# Patient Record
Sex: MALE | Race: WHITE | NOT HISPANIC OR LATINO | URBAN - NONMETROPOLITAN AREA
[De-identification: names, ages, dates, MRNs, and addresses within clinical notes are randomized per-mention and may not be internally consistent; named-entity substitution may affect disease eponyms.]

---

## 2017-12-15 PROBLEM — H35.3131: Noted: 2019-01-07

## 2017-12-15 PROBLEM — H16.223: Noted: 2017-12-15

## 2017-12-15 PROBLEM — H35.373: Noted: 2017-12-15

## 2017-12-15 PROBLEM — Z96.1: Noted: 2017-12-15

## 2019-01-07 ENCOUNTER — IMPORTED ENCOUNTER (OUTPATIENT)
Dept: URBAN - NONMETROPOLITAN AREA CLINIC 1 | Facility: CLINIC | Age: 79
End: 2019-01-07

## 2019-01-07 PROCEDURE — 92134 CPTRZ OPH DX IMG PST SGM RTA: CPT

## 2019-01-07 PROCEDURE — 92014 COMPRE OPH EXAM EST PT 1/>: CPT

## 2019-07-08 ENCOUNTER — IMPORTED ENCOUNTER (OUTPATIENT)
Dept: URBAN - NONMETROPOLITAN AREA CLINIC 1 | Facility: CLINIC | Age: 79
End: 2019-07-08

## 2019-07-08 PROCEDURE — 92014 COMPRE OPH EXAM EST PT 1/>: CPT

## 2019-07-08 PROCEDURE — 92134 CPTRZ OPH DX IMG PST SGM RTA: CPT

## 2019-07-08 PROCEDURE — 92015 DETERMINE REFRACTIVE STATE: CPT

## 2019-07-08 NOTE — PATIENT DISCUSSION
ARMD OU w/ ERM -  discussed findings w/patient-  continue AREDS 2 PO BID -  continue AG use daily patient to call or come in ASAP if changes in vision are noted from today-  OCT mac done today ERM OU stable-  monitor yearly or prn

## 2022-04-09 ASSESSMENT — VISUAL ACUITY
OD_CC: 20/20-2
OS_CC: 20/20
OS_SC: 20/20
OU_SC: 20/20
OD_SC: 20/20

## 2022-04-09 ASSESSMENT — TONOMETRY
OD_IOP_MMHG: 10
OS_IOP_MMHG: 08
OS_IOP_MMHG: 10
OD_IOP_MMHG: 08

## 2022-07-25 ENCOUNTER — HOSPITAL ENCOUNTER (OUTPATIENT)
Age: 82
Setting detail: OUTPATIENT SURGERY
Discharge: HOME | End: 2022-07-25
Attending: INTERNAL MEDICINE | Admitting: INTERNAL MEDICINE
Payer: MEDICARE

## 2022-07-25 VITALS
SYSTOLIC BLOOD PRESSURE: 129 MMHG | HEART RATE: 85 BPM | DIASTOLIC BLOOD PRESSURE: 76 MMHG | HEIGHT: 71 IN | RESPIRATION RATE: 16 BRPM | TEMPERATURE: 98.1 F | BODY MASS INDEX: 16.8 KG/M2 | OXYGEN SATURATION: 100 % | WEIGHT: 120 LBS

## 2022-07-25 DIAGNOSIS — I35.0 AORTIC VALVE STENOSIS, ETIOLOGY OF CARDIAC VALVE DISEASE UNSPECIFIED: ICD-10-CM

## 2022-07-25 PROCEDURE — 74011250636 HC RX REV CODE- 250/636: Performed by: INTERNAL MEDICINE

## 2022-07-25 RX ORDER — SODIUM CHLORIDE 9 MG/ML
75 INJECTION, SOLUTION INTRAVENOUS CONTINUOUS
Status: DISCONTINUED | OUTPATIENT
Start: 2022-07-25 | End: 2022-07-25 | Stop reason: HOSPADM

## 2022-07-25 RX ORDER — TAMSULOSIN HYDROCHLORIDE 0.4 MG/1
0.4 CAPSULE ORAL DAILY
COMMUNITY

## 2022-07-25 RX ORDER — BISMUTH SUBSALICYLATE 262 MG
1 TABLET,CHEWABLE ORAL DAILY
COMMUNITY

## 2022-07-25 RX ORDER — SIMVASTATIN 20 MG/1
20 TABLET, FILM COATED ORAL
COMMUNITY

## 2022-07-25 RX ADMIN — SODIUM CHLORIDE 75 ML/HR: 9 INJECTION, SOLUTION INTRAVENOUS at 13:59

## 2022-07-25 NOTE — ROUTINE PROCESS
Cardiac Cath Lab Recovery Arrival Note:      Sandi Gerard arrived to Cardiac Cath Lab, Recovery Area. Staff introduced to patient. Patient identifiers verified with NAME and DATE OF BIRTH. Procedure verified with patient. Consent forms reviewed and signed by patient or authorized representative and verified. Allergies verified. Patient informed of procedure and plan of care. Questions answered with review. Patient prepped for procedure, per orders from physician, prior to arrival.    Patient on cardiac monitor, non-invasive blood pressure, SPO2 monitor. Patient is A&Ox 40. Patient reports no complaints. Patient in stretcher, in low position, with side rails up, call bell within reach, patient instructed to call of assistance as needed. Patient prep in: [facility] Recovery Area, Bed# FT 5. Family in: waiting room. Prep by: KEENA Cordova

## 2022-07-25 NOTE — PROGRESS NOTES
Procedure cancelled due to procedure delay. Patient and family elected to reschedule. PIV and tele removed. Patient wheeled out via wheelchair.

## 2022-07-25 NOTE — H&P
Outpatient Cath Lab History and Physical     7/25/2022  Patient: Kevin Moore 81 y.o. male   Chief Complaint: []   Angina   [x]   Dyspnea   []       History of Present Illness: (for details see office notes)  82 yo M with severe aortic stenosis, mildly symptomatic with exertional fatigue and minimal ROBB. History:(for details see office notes)  Severe aortic stenosis    Review of Systems  Except as noted in HPI, patient denies recent fever or chills, nausea, vomiting, diarrhea, hemoptysis, hematemesis, dysuria, myalgias, focal neurologic symptoms, ecchymosis, angioedema, odynophagia, dysphagia, sore throat, earache,rash, melena, hematochezia, depression, GERD, cold intolerance, petechia, bleeding gums, or significant weight loss. A comprehensive review of systems was negative except for that written in the HPI. No family history on file. (see office notes for details)  Social History     Tobacco Use    Smoking status: Not on file    Smokeless tobacco: Not on file   Substance Use Topics    Alcohol use: Not on file   (see office notes for details)    Allergies: No Known Allergies    Prior to Admission Medications (details in office records):  Prior to Admission medications    Medication Sig Start Date End Date Taking? Authorizing Provider   tamsulosin (FLOMAX) 0.4 mg capsule Take 0.4 mg by mouth in the morning. Yes Provider, Historical   simvastatin (ZOCOR) 20 mg tablet Take 20 mg by mouth nightly. Yes Provider, Historical   mirabegron ER (Myrbetriq) 25 mg ER tablet Take 25 mg by mouth in the morning. Yes Provider, Historical   multivitamin (ONE A DAY) tablet Take 1 Tablet by mouth in the morning. Yes Provider, Historical   ubidecarenone/vitamin E mixed (COQ10  PO) Take 1 Tablet by mouth daily. Yes Provider, Historical   vit A/vit C/vit E/zinc/copper (OCUVITE PRESERVISION PO) Take  by mouth daily.    Yes Provider, Historical         Physical Exam:  Overall VSSAF;  Visit Vitals  BP (!) 156/79 (BP 1 Location: Right upper arm, BP Patient Position: At rest)   Pulse 84   Temp 98.1 °F (36.7 °C)   Resp 15   Ht 5' 11\" (1.803 m)   Wt 54.4 kg (120 lb)   SpO2 100%   BMI 16.74 kg/m²     Temp (24hrs), Av.1 °F (36.7 °C), Min:98.1 °F (36.7 °C), Max:98.1 °F (36.7 °C)      Physical Exam  GEN: NAD, appears stated age  HEENT: EOMI, MMM, OP clear  NECK: Normal JVP  CV: RRR, normal S1, soft S2, harsh III/VI late-peaking systolic murmur at LUSB, no rubs or gallops  LUNGS: CTAB, no W/R/R  ABD: NABS, soft, NT/ND  EXT: No edema, 2+ and symmetrical radial pulses and pedal pulses b/l  PSYCH: Mood and affect normal  NEURO: AAO, MAEW, face symmetrical, speech intact    Labs: per outpatient records  CXR: per outpatient records    Plan of Care/Planned Procedure:  Risks, benefits, and alternatives reviewed with patient and he agrees to proceed with the procedure. For other plans, see orders. Discussed risks/benefits/alternatives of cardiac catheterization and possible PCI including bleeding, contrast-reaction, kidney injury, stroke, MI, death, loss of limb or the need for emergent surgery and the patient consented to proceed. Plan 6 Fr slender LRA approach. Andrzej Humphreys MD  [facility] / [address] [place] [address] [facility]  22

## 2022-08-01 ENCOUNTER — HOSPITAL ENCOUNTER (OUTPATIENT)
Age: 82
Setting detail: OUTPATIENT SURGERY
Discharge: HOME | End: 2022-08-01
Attending: INTERNAL MEDICINE | Admitting: INTERNAL MEDICINE
Payer: MEDICARE

## 2022-08-01 VITALS
BODY MASS INDEX: 16.8 KG/M2 | SYSTOLIC BLOOD PRESSURE: 117 MMHG | RESPIRATION RATE: 21 BRPM | HEIGHT: 71 IN | DIASTOLIC BLOOD PRESSURE: 58 MMHG | WEIGHT: 120 LBS | HEART RATE: 102 BPM | OXYGEN SATURATION: 94 % | TEMPERATURE: 97.8 F

## 2022-08-01 DIAGNOSIS — I35.0 AORTIC VALVE STENOSIS, ETIOLOGY OF CARDIAC VALVE DISEASE UNSPECIFIED: ICD-10-CM

## 2022-08-01 PROBLEM — I10 PRIMARY HYPERTENSION: Status: ACTIVE | Noted: 2022-08-01

## 2022-08-01 PROBLEM — E78.2 MIXED HYPERLIPIDEMIA: Status: ACTIVE | Noted: 2022-08-01

## 2022-08-01 PROBLEM — N40.0 BPH (BENIGN PROSTATIC HYPERPLASIA): Status: ACTIVE | Noted: 2022-08-01

## 2022-08-01 PROBLEM — C15.9 ESOPHAGEAL CANCER (HCC): Status: ACTIVE | Noted: 2022-08-01

## 2022-08-01 LAB
GLUCOSE BLD STRIP.AUTO-MCNC: 83 MG/DL (ref 65–117)
SERVICE CMNT-IMP: NORMAL

## 2022-08-01 PROCEDURE — 93454 CORONARY ARTERY ANGIO S&I: CPT | Performed by: INTERNAL MEDICINE

## 2022-08-01 PROCEDURE — 77030041063 HC CATH DX ANGI DXTRTY MEDT -A: Performed by: INTERNAL MEDICINE

## 2022-08-01 PROCEDURE — 77030013744: Performed by: INTERNAL MEDICINE

## 2022-08-01 PROCEDURE — 82962 GLUCOSE BLOOD TEST: CPT

## 2022-08-01 PROCEDURE — 77030019569 HC BND COMPR RAD TERU -B: Performed by: INTERNAL MEDICINE

## 2022-08-01 PROCEDURE — 99153 MOD SED SAME PHYS/QHP EA: CPT | Performed by: INTERNAL MEDICINE

## 2022-08-01 PROCEDURE — 74011250636 HC RX REV CODE- 250/636: Performed by: INTERNAL MEDICINE

## 2022-08-01 PROCEDURE — 74011000250 HC RX REV CODE- 250: Performed by: INTERNAL MEDICINE

## 2022-08-01 PROCEDURE — 99152 MOD SED SAME PHYS/QHP 5/>YRS: CPT | Performed by: INTERNAL MEDICINE

## 2022-08-01 PROCEDURE — 76937 US GUIDE VASCULAR ACCESS: CPT | Performed by: INTERNAL MEDICINE

## 2022-08-01 PROCEDURE — 77030010221 HC SPLNT WR POS TELE -B: Performed by: INTERNAL MEDICINE

## 2022-08-01 PROCEDURE — C1894 INTRO/SHEATH, NON-LASER: HCPCS | Performed by: INTERNAL MEDICINE

## 2022-08-01 PROCEDURE — C1769 GUIDE WIRE: HCPCS | Performed by: INTERNAL MEDICINE

## 2022-08-01 RX ORDER — VERAPAMIL HYDROCHLORIDE 2.5 MG/ML
INJECTION, SOLUTION INTRAVENOUS AS NEEDED
Status: DISCONTINUED | OUTPATIENT
Start: 2022-08-01 | End: 2022-08-01 | Stop reason: HOSPADM

## 2022-08-01 RX ORDER — SODIUM CHLORIDE 0.9 % (FLUSH) 0.9 %
5-40 SYRINGE (ML) INJECTION EVERY 8 HOURS
Status: DISCONTINUED | OUTPATIENT
Start: 2022-08-01 | End: 2022-08-01 | Stop reason: HOSPADM

## 2022-08-01 RX ORDER — FENTANYL CITRATE 50 UG/ML
INJECTION, SOLUTION INTRAMUSCULAR; INTRAVENOUS AS NEEDED
Status: DISCONTINUED | OUTPATIENT
Start: 2022-08-01 | End: 2022-08-01 | Stop reason: HOSPADM

## 2022-08-01 RX ORDER — SODIUM CHLORIDE 9 MG/ML
50 INJECTION, SOLUTION INTRAVENOUS CONTINUOUS
Status: DISCONTINUED | OUTPATIENT
Start: 2022-08-01 | End: 2022-08-01 | Stop reason: HOSPADM

## 2022-08-01 RX ORDER — DIPHENHYDRAMINE HYDROCHLORIDE 50 MG/ML
25 INJECTION, SOLUTION INTRAMUSCULAR; INTRAVENOUS
Status: DISCONTINUED | OUTPATIENT
Start: 2022-08-01 | End: 2022-08-01 | Stop reason: HOSPADM

## 2022-08-01 RX ORDER — ACETAMINOPHEN 325 MG/1
650 TABLET ORAL
Status: DISCONTINUED | OUTPATIENT
Start: 2022-08-01 | End: 2022-08-01 | Stop reason: HOSPADM

## 2022-08-01 RX ORDER — MIDAZOLAM HYDROCHLORIDE 1 MG/ML
INJECTION, SOLUTION INTRAMUSCULAR; INTRAVENOUS AS NEEDED
Status: DISCONTINUED | OUTPATIENT
Start: 2022-08-01 | End: 2022-08-01 | Stop reason: HOSPADM

## 2022-08-01 RX ORDER — NALOXONE HYDROCHLORIDE 0.4 MG/ML
0.4 INJECTION, SOLUTION INTRAMUSCULAR; INTRAVENOUS; SUBCUTANEOUS AS NEEDED
Status: DISCONTINUED | OUTPATIENT
Start: 2022-08-01 | End: 2022-08-01 | Stop reason: HOSPADM

## 2022-08-01 RX ORDER — HEPARIN SODIUM 1000 [USP'U]/ML
INJECTION, SOLUTION INTRAVENOUS; SUBCUTANEOUS AS NEEDED
Status: DISCONTINUED | OUTPATIENT
Start: 2022-08-01 | End: 2022-08-01 | Stop reason: HOSPADM

## 2022-08-01 RX ORDER — LIDOCAINE HYDROCHLORIDE 10 MG/ML
INJECTION INFILTRATION; PERINEURAL AS NEEDED
Status: DISCONTINUED | OUTPATIENT
Start: 2022-08-01 | End: 2022-08-01 | Stop reason: HOSPADM

## 2022-08-01 RX ORDER — ASPIRIN 81 MG/1
81 TABLET ORAL DAILY
Qty: 90 TABLET | Refills: 3 | OUTPATIENT
Start: 2022-08-01

## 2022-08-01 RX ORDER — SODIUM CHLORIDE 0.9 % (FLUSH) 0.9 %
5-40 SYRINGE (ML) INJECTION AS NEEDED
Status: DISCONTINUED | OUTPATIENT
Start: 2022-08-01 | End: 2022-08-01 | Stop reason: HOSPADM

## 2022-08-01 RX ADMIN — SODIUM CHLORIDE 50 ML/HR: 9 INJECTION, SOLUTION INTRAVENOUS at 08:40

## 2022-08-01 NOTE — Clinical Note
Left radial artery. Accessed successfully. Radial access needle used. Using ultrasound guidance.  Number of attempts =  1. 6 fr slender

## 2022-08-01 NOTE — PROGRESS NOTES
TRANSFER - IN REPORT:    Verbal report received from [address] on Cynthia Sanchez  being received from Cath lab procedure area  for routine progression of care. Report consisted of patients Situation, Background, Assessment and Recommendations(SBAR). Information from the following report(s) Kardex and Procedure Summary was reviewed with the receiving clinician. Opportunity for questions and clarification was provided. Assessment completed upon patients arrival to [address] [place] [address] and care assumed. Cardiac Cath Lab Recovery Arrival Note:    Cynthia Sanchez arrived to [facility] recovery area. Patient procedure= LHC. Patient on cardiac monitor, non-invasive blood pressure, SPO2 monitor. On RA . IV  of NS on pump at 25 ml/hr. Patient status doing well without problems. Patient is A&Ox 3. Patient reports no pain. PROCEDURE SITE CHECK:    Procedure site:without any bleeding and no hematoma, No pain/discomfort reported at procedure site. No change in patient status. Continue to monitor patient and status.

## 2022-08-01 NOTE — Clinical Note
Transfer to [facility] RR from Procedure Area    Verbal report given to RR RN on Reza Bueno being transferred to Cardiac Cath Lab RR for routine progression of care   Patient is post [facility] procedure. Patient stable upon transfer to . Report consisted of patients Situation, Background, Assessment and   Recommendations(SBAR). Information from the following report(s) SBAR, Procedure Summary, Intake/Output, MAR, Recent Results and Med Rec Status was reviewed with the receiving nurse. Opportunity for questions and clarification was provided. Patient medicated during procedure with orders obtained and verified by Dr. Sid Herrera. Refer to patient PROCEDURE REPORT for vital signs, assessment, status, and response during procedure.

## 2022-08-01 NOTE — Clinical Note
Cardiac Cath Lab Procedure Area Arrival Note:    Remington Cast arrived to Cardiac Cath Lab, Procedure Area. Patient identifiers verified with NAME and DATE OF BIRTH. Procedure verified with patient. Consent forms verified. Allergies verified. Patient informed of procedure and plan of care. Questions answered with review. Patient voiced understanding of procedure and plan of care. Patient on cardiac monitor, non-invasive blood pressure, SPO2 monitor. On room air or O2 @ 2 lpm via nasal cannula. IV of nacl on pump at 50 ml/hr. Patient status doing well without problems. Patient is A&Ox 4. Patient reports no chest pain or SOB. Patient medicated during procedure with orders obtained and verified by Dr. Nito Thornton. Refer to patients Cardiac Cath Lab PROCEDURE REPORT for vital signs, assessment, status, and response during procedure, printed at end of case. Printed report on chart or scanned into chart. Problem: Patient Care Overview  Goal: Plan of Care Review  Outcome: Ongoing (interventions implemented as appropriate)   10/23/19 1023   Plan of Care Review   Progress no change   OTHER   Outcome Summary Patient follow up for ulceration on right lateral ankle-have been treating with hydrofera blue-wound bed appears to be drying a bit-some slough noted-dressing change done-will continue with hydrofera blue every other day-WOC will continue to follow   Coping/Psychosocial   Plan of Care Reviewed With patient;family

## 2022-08-01 NOTE — H&P
Outpatient Cath Lab History and Physical     8/1/2022  Patient: Beinta Luis 81 y.o. male   Chief Complaint: []   Angina   [x]   Dyspnea   []       History of Present Illness: (for details see office notes)  82 yo M with severe aortic stenosis, mildly symptomatic with exertional fatigue and minimal ROBB. History:(for details see office notes)  Past Medical History:   Diagnosis Date    BPH (benign prostatic hyperplasia)     Esophageal cancer ([facility] [address]     Mixed hyperlipidemia     Nonrheumatic aortic valve stenosis     Primary hypertension        Review of Systems  Except as noted in HPI, patient denies recent fever or chills, nausea, vomiting, diarrhea, hemoptysis, hematemesis, dysuria, myalgias, focal neurologic symptoms, ecchymosis, angioedema, odynophagia, dysphagia, sore throat, earache,rash, melena, hematochezia, depression, GERD, cold intolerance, petechia, bleeding gums, or significant weight loss. A comprehensive review of systems was negative except for that written in the HPI. No family history on file. (see office notes for details)  Social History     Tobacco Use    Smoking status: Not on file    Smokeless tobacco: Not on file   Substance Use Topics    Alcohol use: Not on file   (see office notes for details)    Allergies: No Known Allergies    Prior to Admission Medications (details in office records):  Prior to Admission medications    Medication Sig Start Date End Date Taking? Authorizing Provider   tamsulosin (FLOMAX) 0.4 mg capsule Take 0.4 mg by mouth in the morning. Yes Provider, Historical   simvastatin (ZOCOR) 20 mg tablet Take 20 mg by mouth nightly. Yes Provider, Historical   mirabegron ER (MYRBETRIQ) 25 mg ER tablet Take 25 mg by mouth in the morning. Yes Provider, Historical   multivitamin (ONE A DAY) tablet Take 1 Tablet by mouth in the morning. Yes Provider, Historical   ubidecarenone/vitamin E mixed (COQ10  PO) Take 1 Tablet by mouth daily.    Yes Provider, Historical   vit A/vit C/vit E/zinc/copper (OCUVITE PRESERVISION PO) Take  by mouth daily. Yes Provider, Historical         Physical Exam:  Overall VSSAF;  Visit Vitals  BP (!) 160/70 (BP 1 Location: Left upper arm, BP Patient Position: At rest)   Pulse 79   Temp 97.8 °F (36.6 °C)   Resp 16   Ht 5' 11\" (1.803 m)   Wt 54.4 kg (120 lb)   SpO2 100%   BMI 16.74 kg/m²     Temp (24hrs), Av.8 °F (36.6 °C), Min:97.8 °F (36.6 °C), Max:97.8 °F (36.6 °C)      Physical Exam  GEN: NAD, appears stated age  HEENT: EOMI, MMM, OP clear  NECK: Normal JVP  CV: RRR, normal S1, soft S2, harsh III/VI late-peaking systolic murmur at LUSB, no rubs or gallops  LUNGS: CTAB, no W/R/R  ABD: NABS, soft, NT/ND  EXT: No edema, 2+ and symmetrical radial pulses and pedal pulses b/l  PSYCH: Mood and affect normal  NEURO: AAO, MAEW, face symmetrical, speech intact    Labs: per outpatient records  CXR: per outpatient records    Plan of Care/Planned Procedure:  Risks, benefits, and alternatives reviewed with patient and he agrees to proceed with the procedure. For other plans, see orders. Discussed risks/benefits/alternatives of cardiac catheterization and possible PCI including bleeding, contrast-reaction, kidney injury, stroke, MI, death, loss of limb or the need for emergent surgery and the patient consented to proceed. Plan 6 Fr slender LRA approach. Josephine Trejo, [address] [place] [address] [facility]  22

## 2022-08-01 NOTE — DISCHARGE INSTRUCTIONS
**You no have significant blockages in your coronary arteries (heart arteries). **Your aortic valve is severely narrowed. This is called severe aortic stenosis. This is treated with aortic valve replacement. We will evaluate your candidacy for a Transcatheter Aortic Valve Replacement (TAVR), a minimally-invasive method to replace your aortic valve. **You will be contacted to schedule the following appointments:  - Exercise treadmill stress test (to determine if you are having symptoms related to your narrowed aortic valve)    **There has been ONE CHANGE to your medications. Please see below for your final medication list.    **Dr. Montanez Free office will call you to schedule an appointment with him in approximately 1 month to re-evaluate how you are doing and to adjust your medications as necessary. YOUR CURRENT MEDICATIONS  Current Discharge Medication List        START taking these medications    Details   aspirin delayed-release 81 mg tablet Take 1 Tablet by mouth in the morning. Indications: coronary artery disease  Qty: 90 Tablet, Refills: 3  Start date: 8/1/2022           CONTINUE these medications which have NOT CHANGED    Details   tamsulosin (FLOMAX) 0.4 mg capsule Take 0.4 mg by mouth in the morning. simvastatin (ZOCOR) 20 mg tablet Take 20 mg by mouth nightly. mirabegron ER (MYRBETRIQ) 25 mg ER tablet Take 25 mg by mouth in the morning.      multivitamin (ONE A DAY) tablet Take 1 Tablet by mouth in the morning. ubidecarenone/vitamin E mixed (COQ10  PO) Take 1 Tablet by mouth daily. vit A/vit C/vit E/zinc/copper (OCUVITE PRESERVISION PO) Take  by mouth daily. After Your Cardiac Catheterization    Cardiac catheterization (also called cardiac cath) is an invasive imaging procedure that allows your doctor to look at your coronary arteries to diagnose coronary artery disease.  It can also be used to measure pressures in your chambers, and evaluate the function of your heart. Instructions for going home after Cardiac Catheterization    Care for the Catheter Insertion Site  Procedures may be performed in the femoral artery in the groin (in the area at the top of your thigh) or in the radial artery in your arm. When you go home, there will be a bandage (dressing) over the catheter insertion site (also called the wound site). The morning after your procedure, you may take the dressing off. The easiest way to do this is when you are showering, get the tape and dressing wet and remove it. After the bandage is removed, cover the area with a small adhesive bandage. It is normal for the catheter insertion site to be black and blue for a couple of days. The site may also be slightly swollen and pink, and there may be a small lump (about the size of a quarter) at the site. Wash the catheter insertion site at least once daily with soap and water. Place soapy water on your hand or washcloth and gently wash the insertion site; do not rub. Keep the area clean and dry when you are not showering. Do not use powders, creams, lotions or ointment on the wound site. Wear loose clothes and loose underwear. Do not take a bath, tub soak, go in a Jacuzzi, or swim in a pool or lake for one week after the procedure. You may notice a small lump at the site. This is normal and may last up to 6 weeks. CHECK THE CATHETER INSERTION SITE DAILY:    If bleeding at the cath site occurs, take a clean washcloth and apply direct pressure just above the puncture site for at least 15 minutes. Call 911 immediately if the bleeding is not controlled. Continue to apply direct pressure until an ambulance gets to your location. Do not try to drive yourself or have someone else drive you to the hospital.  Have the ambulance bring you to the emergency room. Activity Guidelines  Your doctor will tell you when you can resume activities.  In general, you will need to take it easy for the first two days after you get home. You can expect to feel tired and weak the day after the procedure. Take walks around your house and plan to rest during the day. For femoral cardiac cath  Do not strain during bowel movements for the first 3 to 4 days after the procedure to prevent bleeding from the catheter insertion site. Avoid heavy lifting (more than 10 pounds) and pushing or pulling heavy objects for the first 5 to 7 days after the procedure. Do not participate in strenuous activities for 5 days after the procedure. This includes most sports - jogging, golfing, play tennis, and bowling. You may climb stairs if needed, but walk up and down the stairs more slowly than usual.   Gradually increase your activities until you reach your normal activity level within one week after the procedure. For radial cardiac cath  Do not participate in strenuous activities for 2 days after the procedure. This includes most sports - jogging, golfing, play tennis, and bowling. Gradually increase your activities until you reach your normal activity level within two days after the procedure. Ask your doctor when it is safe to  Return to work. Resume sexual activity. Resume driving. Most people are able to resume driving within 24 hours after going home. Medications  Please review your medications with your doctor before you go home. Ask your doctor if you should continue taking the medications you were taking before the procedure. If you have diabetes, your doctor may adjust your diabetes medications for one to two days after your procedure. Please be sure to ask for specific directions about taking your diabetes medication after the procedure. Depending on the results of your procedure, your doctor may prescribe new medication. Please make sure you understand what medications you should be taking after the procedure and how often to take them.   You may use Tylenol 325mg 1-2 tablets every 6 hours as needed for pain or discomfort, unless otherwise instructed. If you have significant         discomfort more than 48 hours after your procedure, please call your physicians office. If you take METFORMIN, do NOT take this for the next 2 days after your procedure. Importance of a Heart-Healthy Lifestyle  It is important for you to be committed to leading a heart-healthy lifestyle. Your health care team can help you achieve your goals, but it is up to you to take your medications as prescribed, make dietary changes, quit smoking, exercise regularly, keep your follow-up appointments and be an active member of the treatment team.    Follow Up  Please call your cardiologist to schedule a follow-up appointment in the next 1-2 weeks if possible. Ask your primary care doctor when you should return for follow-up. Please ask your doctor if you have any questions about cardiac catheterization, angioplasty or stenting. If possible, have someone stay with you for the first night. It is normal to feel tired for the first couple of days. Take it easy and follow your physicians instructions on activity. CALL THE PHYSICIAN:  If the site becomes red, swollen, or feels warm to the touch, or is healing poorly    If you note any large/extending bruise, fever >101.0 or chills  If your extremity has numbness, tingling, discoloration, abnormal swelling, tightness or pain   If you have difficulty with urination or develop nausea, vomiting, or severe abdominal pain    SIGNS AND SYMPTOMS:  Notify your physician for new or recurrent symptoms of chest discomfort, unusual shortness of breath or fatigue. These could be signs of a problem and should be discussed with your physician. For significant chest pain or symptoms of angina not relieved with rest:  if you have been prescribed Nitroglycerin, take as directed (taken under the tongue, one at a time 5 minutes apart for a total of 3 doses, sitting down).  If the discomfort is not relieved after the 3rd Nitroglycerin, call 911. If you have not been prescribed Nitroglycerin and your chest discomfort is significant, call 911. Take the ambulance, do not try to drive yourself or have someone else drive you to the hospital.     AFTER CARE:  Follow up with your physician as instructed  Follow a heart healthy diet with proper portion control, daily stress management, daily exercise, blood pressure and cholesterol control, and smoking cessation. The success of your stent, if you had one placed, and the prevention of future catheterizations heavily depends on your lifestyle changes you make now! You may start walking short distances the day of your procedure. Gradually increase as tolerated each day. Current activity recommendations are 30 minutes of exercise at least 5 days a week. Work up to this as you recover. Walk, ride a bike, or choose any other activity you enjoy to reach this activity goal.   Avoid walking outside in extremes of heat or cold. Walk inside (at home, at the mall, or at a large store) when it is cold and windy or hot and humid. If you had a stent placed, consider [facility] as a resource to help you make the needed lifestyle changes to live a heart healthy lifestyle. Discuss your candidacy with your physician. If you have questions, call your physicians office or the Cardiac Cath Lab at [phone]. The Cath Lab is operational from 6:30 a.m. to 5:00 p.m., Monday through Friday. After hours, notify your physician. [address] [place] [address] can be reached at [phone]. [facility] is operational Monday-Thursday 8:30 a.m. to 5:00 p.m. and Friday 8:30 a.m. to 12:00 p.m.     Remember:  IN CASE OF BLEEDING: KEEP FIRM PRESSURE ON THE PROCEDURE SITE AND CALL 911 IF NOT CONTROLLED

## 2022-08-09 ENCOUNTER — TRANSCRIBE ORDER (OUTPATIENT)
Dept: SCHEDULING | Age: 82
End: 2022-08-09

## 2022-08-09 DIAGNOSIS — I35.0 NODULAR CALCIFIC AORTIC VALVE STENOSIS: Primary | ICD-10-CM

## 2022-08-15 ENCOUNTER — TRANSCRIBE ORDER (OUTPATIENT)
Dept: SCHEDULING | Age: 82
End: 2022-08-15

## 2022-08-15 DIAGNOSIS — I35.0 AORTIC STENOSIS: Primary | ICD-10-CM

## 2022-08-18 ENCOUNTER — HOSPITAL ENCOUNTER (OUTPATIENT)
Dept: NON INVASIVE DIAGNOSTICS | Age: 82
Discharge: HOME | End: 2022-08-18
Attending: INTERNAL MEDICINE
Payer: MEDICARE

## 2022-08-18 VITALS — BODY MASS INDEX: 16.8 KG/M2 | WEIGHT: 120 LBS | HEIGHT: 71 IN

## 2022-08-18 DIAGNOSIS — I35.0 AORTIC STENOSIS: ICD-10-CM

## 2022-08-18 LAB
STRESS ANGINA INDEX: 0
STRESS BASELINE DIAS BP: 78 MMHG
STRESS BASELINE HR: 67 BPM
STRESS BASELINE SYS BP: 161 MMHG
STRESS ESTIMATED WORKLOAD: 6 METS
STRESS EXERCISE DUR MIN: 4 MIN
STRESS EXERCISE DUR SEC: 12 SEC
STRESS PEAK DIAS BP: 86 MMHG
STRESS PEAK SYS BP: 193 MMHG
STRESS PERCENT HR ACHIEVED: 109 %
STRESS POST PEAK HR: 151 BPM
STRESS RATE PRESSURE PRODUCT: NORMAL BPM*MMHG
STRESS STAGE 1 HR: 139 BPM
STRESS STAGE 2 HR: 143 BPM
STRESS STAGE RECOVERY 1 BP: NORMAL MMHG
STRESS STAGE RECOVERY 1 HR: 130 BPM
STRESS STAGE RECOVERY 2 BP: NORMAL MMHG
STRESS STAGE RECOVERY 2 HR: 98 BPM
STRESS STAGE RECOVERY 3 BP: NORMAL MMHG
STRESS STAGE RECOVERY 3 HR: 86 BPM
STRESS TARGET HR: 139 BPM

## 2022-08-18 PROCEDURE — 93017 CV STRESS TEST TRACING ONLY: CPT

## 2022-12-15 ENCOUNTER — TELEPHONE (OUTPATIENT)
Dept: CARDIOLOGY CLINIC | Age: 82
End: 2022-12-15

## 2023-01-05 ENCOUNTER — OFFICE VISIT (OUTPATIENT)
Dept: CARDIOLOGY CLINIC | Age: 83
End: 2023-01-05
Payer: MEDICARE

## 2023-01-05 VITALS
WEIGHT: 120.5 LBS | BODY MASS INDEX: 16.81 KG/M2 | HEART RATE: 84 BPM | SYSTOLIC BLOOD PRESSURE: 113 MMHG | OXYGEN SATURATION: 98 % | DIASTOLIC BLOOD PRESSURE: 71 MMHG

## 2023-01-05 DIAGNOSIS — I35.0 NONRHEUMATIC AORTIC VALVE STENOSIS: Primary | ICD-10-CM

## 2023-01-05 PROCEDURE — 3078F DIAST BP <80 MM HG: CPT | Performed by: THORACIC SURGERY (CARDIOTHORACIC VASCULAR SURGERY)

## 2023-01-05 PROCEDURE — G8536 NO DOC ELDER MAL SCRN: HCPCS | Performed by: THORACIC SURGERY (CARDIOTHORACIC VASCULAR SURGERY)

## 2023-01-05 PROCEDURE — 1101F PT FALLS ASSESS-DOCD LE1/YR: CPT | Performed by: THORACIC SURGERY (CARDIOTHORACIC VASCULAR SURGERY)

## 2023-01-05 PROCEDURE — G8432 DEP SCR NOT DOC, RNG: HCPCS | Performed by: THORACIC SURGERY (CARDIOTHORACIC VASCULAR SURGERY)

## 2023-01-05 PROCEDURE — 3074F SYST BP LT 130 MM HG: CPT | Performed by: THORACIC SURGERY (CARDIOTHORACIC VASCULAR SURGERY)

## 2023-01-05 PROCEDURE — G8427 DOCREV CUR MEDS BY ELIG CLIN: HCPCS | Performed by: THORACIC SURGERY (CARDIOTHORACIC VASCULAR SURGERY)

## 2023-01-05 PROCEDURE — 1123F ACP DISCUSS/DSCN MKR DOCD: CPT | Performed by: THORACIC SURGERY (CARDIOTHORACIC VASCULAR SURGERY)

## 2023-01-05 PROCEDURE — 99214 OFFICE O/P EST MOD 30 MIN: CPT | Performed by: THORACIC SURGERY (CARDIOTHORACIC VASCULAR SURGERY)

## 2023-01-05 PROCEDURE — G8419 CALC BMI OUT NRM PARAM NOF/U: HCPCS | Performed by: THORACIC SURGERY (CARDIOTHORACIC VASCULAR SURGERY)

## 2023-01-05 NOTE — PROGRESS NOTES
Patient: Álvaro Cobb   Age: 81 y.o. Patient Care Team:  Bsamauri, Not On File, MD as PCP - General (Orthopaedic Trauma Physician)  Salina Turk MD ([address] Vascular Surgery)  Yasmin Shell MD (Cardiothoracic Surgery)  Nicholas Ohara MD (Cardiothoracic Surgery)    PCP: Raúl Acevedo Not On File, MD    Cardiologist: Dr. Nehal Manning    Diagnosis/Reason for Consultation: The encounter diagnosis was Nonrheumatic aortic valve stenosis. Problem List:   Patient Active Problem List   Diagnosis Code    Nonrheumatic aortic valve stenosis I35.0    Primary hypertension I10    Mixed hyperlipidemia E78.2    BPH (benign prostatic hyperplasia) N40.0    Esophageal cancer (HCC) C15.9         HPI: 81 y.o. male with PMHx of Aortic Stenosis, HTN, HLD, BPH, Hx of Esophageal CA s/p Esophagectomy in 2004, that is referred to the [address] [place] [address] by Dr. Nehal Manning for interventional evaluation of  AS. He was last seen in the valve clinic in August 2022. At that time, he was mildly symptomatic and wasn't ready to proceed with TAVR. He returns today as his symptoms have been getting worse and he is now ready to proceed. He states that he has known about his murmur for many years and it has remained under surveillance. He only first noticed symptoms in June 2022. He was outside carrying a ladder and became dizzy and dyspneic. He stopped to rest and his symptoms resolved. Winter of 2021/22 he ran up 6 flights of stairs without much issue. He has never had syncope and no recent fall. He had continued to work as a  until June 2022. He also used to [employer] but hasn't done that in a while. Since we last saw him, he has been getting more winded when going up the stairs. He also has noted some episode which he describes as feeling \"out of body\" or a dizzy feeling. He denies LE edema, CP, Palpitations, orthopena, PND, GIB, or HF hospitalization in the last year. He recently retired to [employer]. He lives with his wife in [place]. She has some memory issues and he helps her with that. His sister-in-law also lives there and could help following a surgery. He is completely independent in his ADLs. He does not smoke, drink alcohol, or use recreational drugs. SOB/ROBB: Yes   Fatigue: Yes   Palpitations: No  Chest pain: No:    Chest tightness with activity: No:    Lightheadedness: Yes   Syncope: No:    Falls: No:    Orthopnea: No:    PND: No:    LE edema: No:    Medication changes in past 3 months: No:    Blood/Blackness/Tarriness of stools: No:    Heart Failure Admission w/in past year:  No:    Heart Failure Admission w/in past 2 weeks: No:     NYHA Classification: IIB   Class I (Mild): No limitation of physical activity. Ordinary physical activity does not cause undue fatigue, palpitation, or dyspnea. Class II (Mild): Slight limitation of physical activity. Comfortable at rest, but ordinary physical activity results in fatigue, palpitation, or dyspnea. Class III (Moderate): Marked limitation of physical activity. Comfortable at rest, but less than ordinary activity causes fatigue, palpitation, or dyspnea   Class IV (Severe): Unable to carry out any physical activity without discomfort. Symptoms  of cardiac insufficiency at rest.  If any physical activity is undertaken, discomfort is increased.     Angina Classification: 0   Class 0: No symptoms   Class 1: Angina with strenuous exercise   Class 2: Angina with moderate exercise   Class 3: Angina with mild exertion   Walking 1-2 level blocks at normal pace; Climbing 1 flight of stairs at normal pace  Class 4: Angina at any level of physical exertion       Past Medical History:   Diagnosis Date    BPH (benign prostatic hyperplasia)     Esophageal cancer (HCC)     Mixed hyperlipidemia     Nonrheumatic aortic valve stenosis     Primary hypertension      Endocarditis: No:    Pulmonary HTN:  No:  Greater than 2/3 systemic: No: Immunocompromised/Steroids: No:   Liver Dz/Cirrhosis: No:    If yes, MELD score:  n/a  Any dental pain/concerns: None    Past Surgical History:   Procedure Laterality Date    HX ESOPHAGECTOMY  2004      Social History     Tobacco Use    Smoking status: Not on file    Smokeless tobacco: Not on file   Substance Use Topics    Alcohol use: Not on file      No family history on file. Prior to Admission medications    Medication Sig Start Date End Date Taking? Authorizing Provider   aspirin delayed-release 81 mg tablet Take 1 Tablet by mouth in the morning. Indications: coronary artery disease 8/1/22   Mari Bolanos MD   [facility]) 0.4 mg capsule Take 0.4 mg by mouth in the morning. Provider, Historical   simvastatin (ZOCOR) 20 mg tablet Take 20 mg by mouth nightly. Provider, Historical   mirabegron ER (MYRBETRIQ) 25 mg ER tablet Take 25 mg by mouth in the morning. Provider, Historical   multivitamin (ONE A DAY) tablet Take 1 Tablet by mouth in the morning. Provider, Historical   ubidecarenone/vitamin E mixed (COQ10  PO) Take 1 Tablet by mouth daily. Provider, Historical   vit A/vit C/vit E/zinc/copper (OCUVITE PRESERVISION PO) Take  by mouth daily. Provider, Historical       No Known Allergies    Current Medications:   Current Outpatient Medications   Medication Sig Dispense Refill    aspirin delayed-release 81 mg tablet Take 1 Tablet by mouth in the morning. Indications: coronary artery disease 90 Tablet 3    tamsulosin (FLOMAX) 0.4 mg capsule Take 0.4 mg by mouth in the morning. simvastatin (ZOCOR) 20 mg tablet Take 20 mg by mouth nightly. mirabegron ER (MYRBETRIQ) 25 mg ER tablet Take 25 mg by mouth in the morning.      multivitamin (ONE A DAY) tablet Take 1 Tablet by mouth in the morning. ubidecarenone/vitamin E mixed (COQ10  PO) Take 1 Tablet by mouth daily. vit A/vit C/vit E/zinc/copper (OCUVITE PRESERVISION PO) Take  by mouth daily.          Vitals: Blood pressure 113/71, pulse 84, weight 120 lb 8 oz (54.7 kg), SpO2 98 %. Allergies: has No Known Allergies. Review of Systems: Pertinent Positives per HPI   [] Unable to obtain  ROS due to  []mental status change  []sedated   []intubated   [x]Total of 13 systems reviewed as follows:  Constitutional: Negative fever, negative chills, +fatigue  Eyes:               Negative for amauroses fugax, +glasses  ENT:                Negative sore throat,oral abscess   Endocrine        Negative for thyroid replacement Rx; goiter; DM  Respiratory:     Negative chronic cough,sputum production, +dyspnea on exertion  Cards:              Negative for palpitations, varicosities, claudication, lower extremity edema  GI:                   Negative for dysphagia, bleeding, nausea, vomiting, diarrhea, and abdominal pain  Genitourinary: Negative for frequency, dysuria, +BPH   Integument:     Negative for rash and pruritus  Hematologic:   Negative for easy bruising; bleeding dyscrasia   Musculoskel:   Negative for muscle weakness inhibiting ambulation, +chronic back pain  Neurological:   Negative for stroke, TIA, syncope, +dizziness  Behavl/Psych: Negative for feelings of anxiety, depression    Cardiovascular Testing:     EKG: Needs updated    TTE:  7/5/22 Done at [facility] and scanned in  LVEF 50-55%  RANDY 0.7 cm2  MG 59 mmHg   mmHg  PV 5 m/s  Mild AI, Mild MR, Mild TR, Severe AS, Mild MS    Exercise stress test: 8/18/22  Interpretation Summary         Stress Test: A Yossi protocol stress test was performed. Overall, the patient's exercise capacity was average for their age (6.0 METS). The patient reached stage 2 of the protocol after exercising for 4 min and 12 sec. Blood pressure demonstrated a normal response and heart rate demonstrated a normal response to stress.  The patient's heart rate recovery was normal. The patient reported dyspnea, fatigue and no chest pain during the stress test.    ECG: Resting ECG demonstrates normal sinus rhythm, LVH with repolarization changes. ECG: Stress ECG was not diagnostic due to baseline ECG abnormality. Stress Findings    Resting ECG ECG is abnormal. The ECG shows sinus rhythm, LVH with repolarization changes. Stress ECG There were no arrhythmias during stress. The ECG was not diagnostic due to baseline ECG abnormality. Stress Test A Yossi protocol stress test was performed. Overall, the patient's exercise capacity was average for their age. The patient reached stage 2 of the protocol after exercising for 4 min and 12 sec. The patient experienced no angina during the test. Blood pressure demonstrated a normal response and heart rate demonstrated a normal response to stress. The patient's heart rate recovery was normal. The patient reported dyspnea, fatigue and no chest pain during the stress test. Onset of symptoms occurred at stage 2 of the protocol. The patient reached the end of the protocol and achieved the target heart rate. The patient requested the test to be stopped. Cardiac catheterization: 8/1/22  Conclusion    Catheters used:  5 Fr JL4, JR4 diagnostic catheters  Heparin was used for procedural anticoagulation  Uncomplicated ultrasound-guided access of the left radial artery   Successful hemostasis of the 6 Fr slender LRA access site using a TR band     1. 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 2. Severely calcified aortic valve in keeping with known diagnosis of aortic stenosis. 3. Given that the patient has recently noticed a lack of symptoms, we will do a treadmill exercise stress test to ensure that he is truly asymptomatic. 4. Results discussed with the patient and  the patient's family. Complications    Complications documented before study signed (8/1/2022 80:92 PM)     No complications were associated with this study.    Documented by Oumou Cagle MD - 8/1/2022 10:39 PM        Coronary Findings    Diagnostic  Dominance: Right  Left Main   The vessel was visualized by angiography. The vessel is angiographically normal.   Left Anterior Descending   Mid LAD lesion, 40% stenosed. Left Circumflex   Prox Cx lesion, 50% stenosed. First Obtuse Marginal Branch   The vessel is small. Second Obtuse Marginal Branch   The vessel is small. Third Obtuse Marginal Branch   The vessel is small. Right Coronary Artery   The vessel was visualized by angiography. There is mild disease. Intervention    No interventions have been documented. Carotid Dopplers: None     PFTs: FEV1/Predicted:  None  Normal FEV1 > 1 Liter, Predicted = 100%, DLCO > 80%    Mild Lung Disease (60-70% Predicted)    Moderate Lung Disease (50-55% Predicted)    Severe Lung Disease (< 50% Predicted or PO2 < 60 or pCO2>50 on RA)    Gated C/A/P CTA: Completed at [facility]    Physical Exam:  General: Well nourished well groomed male appearing stated age  Neuro: A&OX3. BAI. PERRL. Head:Normocephalic. Atraumatic. Symmetrical  Neck: Trachea Midline  Resp: CTA B. No Adv BS/cough/sputum/tachypnea with seated conversation  CV: S1S2 RRR. COURTNEY IV/VI. No JVD/carotid bruits. Pink/warm/dry extremities. No LE peripheral edema  GI:Benign ab. Soft. NT/ND. Active BS  : Voids  Integ: No obvious s/s of infection or breakdown  Musculo/Skeletal: FROM in all major joints. Good muscle tone    Clinic Evaluation:   KCCQ-12: scanned into EMR    5 meter gait: 4.39 seconds    Frality Survey:  Eppie Haus Index ADL - 6/6  scanned into EMR     STS 4.2 Risk Score / Predicted 30 day mortality: - calculations scanned into EMR  1.478%/8.219%    Assessment/Plan: Aortic Stenosis-severe and symptomatic: Plan for TAVR - he will check his schedule and call back with a date. Intermediate surgical risk. ASA following TAVR.    HTN: Not currently on antihypertensives  HLD: Statin  BPH: Flomax  Hx of Esophageal CA s/p Esophagectomy 2004: No current treatment     The patient was evaluated by Nacho Reeder who discussed conventional aortic valve replacement and TAVR, as well as the risks and benefits of both versus continuing medical therapy with the patient. All questions were answered. Nacho Reeder felt that TAVR is a better option for this patient, given age, frailty, and co-morbidities. With all the options available, the patient has agreed to proceed with TAVR for the treatment of his aortic stenosis and will be scheduled.

## 2023-01-31 DIAGNOSIS — I35.0 AORTIC VALVE STENOSIS, ETIOLOGY OF CARDIAC VALVE DISEASE UNSPECIFIED: Primary | ICD-10-CM

## 2023-02-01 ENCOUNTER — HOSPITAL ENCOUNTER (OUTPATIENT)
Dept: PULMONOLOGY | Age: 83
Discharge: HOME | End: 2023-02-01
Attending: NURSE PRACTITIONER
Payer: MEDICARE

## 2023-02-01 ENCOUNTER — HOSPITAL ENCOUNTER (OUTPATIENT)
Dept: GENERAL RADIOLOGY | Age: 83
Discharge: HOME | End: 2023-02-01
Payer: MEDICARE

## 2023-02-01 ENCOUNTER — OFFICE VISIT (OUTPATIENT)
Dept: CARDIOLOGY CLINIC | Age: 83
End: 2023-02-01

## 2023-02-01 ENCOUNTER — HOSPITAL ENCOUNTER (OUTPATIENT)
Dept: PREADMISSION TESTING | Age: 83
Discharge: HOME | End: 2023-02-01
Payer: MEDICARE

## 2023-02-01 VITALS
HEIGHT: 70 IN | BODY MASS INDEX: 17.18 KG/M2 | DIASTOLIC BLOOD PRESSURE: 80 MMHG | HEART RATE: 99 BPM | SYSTOLIC BLOOD PRESSURE: 160 MMHG | WEIGHT: 120 LBS | TEMPERATURE: 97.8 F

## 2023-02-01 DIAGNOSIS — I35.0 NONRHEUMATIC AORTIC VALVE STENOSIS: ICD-10-CM

## 2023-02-01 DIAGNOSIS — I35.0 NONRHEUMATIC AORTIC VALVE STENOSIS: Primary | ICD-10-CM

## 2023-02-01 LAB
ABO + RH BLD: NORMAL
ALBUMIN SERPL-MCNC: 3.6 G/DL (ref 3.5–5)
ALBUMIN/GLOB SERPL: 1.3 (ref 1.1–2.2)
ALP SERPL-CCNC: 78 U/L (ref 45–117)
ALT SERPL-CCNC: 18 U/L (ref 12–78)
ANION GAP SERPL CALC-SCNC: 2 MMOL/L (ref 5–15)
APPEARANCE UR: CLEAR
APTT PPP: 29.1 SEC (ref 22.1–31)
ARTERIAL PATENCY WRIST A: POSITIVE
AST SERPL-CCNC: 18 U/L (ref 15–37)
BACTERIA URNS QL MICRO: NEGATIVE /HPF
BASE EXCESS BLD CALC-SCNC: 4.6 MMOL/L
BASOPHILS # BLD: 0 K/UL (ref 0–0.1)
BASOPHILS NFR BLD: 0 % (ref 0–1)
BDY SITE: ABNORMAL
BILIRUB SERPL-MCNC: 0.4 MG/DL (ref 0.2–1)
BILIRUB UR QL: NEGATIVE
BLOOD GROUP ANTIBODIES SERPL: NORMAL
BUN SERPL-MCNC: 21 MG/DL (ref 6–20)
BUN/CREAT SERPL: 21 (ref 12–20)
CALCIUM SERPL-MCNC: 9.1 MG/DL (ref 8.5–10.1)
CHLORIDE SERPL-SCNC: 103 MMOL/L (ref 97–108)
CO2 SERPL-SCNC: 34 MMOL/L (ref 21–32)
COLOR UR: NORMAL
CREAT SERPL-MCNC: 1.02 MG/DL (ref 0.7–1.3)
DIFFERENTIAL METHOD BLD: ABNORMAL
EOSINOPHIL # BLD: 0.5 K/UL (ref 0–0.4)
EOSINOPHIL NFR BLD: 10 % (ref 0–7)
EPITH CASTS URNS QL MICRO: NORMAL /LPF
ERYTHROCYTE [DISTWIDTH] IN BLOOD BY AUTOMATED COUNT: 14.2 % (ref 11.5–14.5)
EST. AVERAGE GLUCOSE BLD GHB EST-MCNC: 97 MG/DL
GAS FLOW.O2 O2 DELIVERY SYS: ABNORMAL
GLOBULIN SER CALC-MCNC: 2.7 G/DL (ref 2–4)
GLUCOSE SERPL-MCNC: 184 MG/DL (ref 65–100)
GLUCOSE UR STRIP.AUTO-MCNC: NEGATIVE MG/DL
HBA1C MFR BLD: 5 % (ref 4–5.6)
HCO3 BLD-SCNC: 29.4 MMOL/L (ref 22–26)
HCT VFR BLD AUTO: 39.8 % (ref 36.6–50.3)
HGB BLD-MCNC: 12.3 G/DL (ref 12.1–17)
HGB UR QL STRIP: NEGATIVE
HISTORY CHECKED?,CKHIST: NORMAL
HYALINE CASTS URNS QL MICRO: NORMAL /LPF (ref 0–5)
IMM GRANULOCYTES # BLD AUTO: 0 K/UL (ref 0–0.04)
IMM GRANULOCYTES NFR BLD AUTO: 0 % (ref 0–0.5)
INR PPP: 1.1 (ref 0.9–1.1)
KETONES UR QL STRIP.AUTO: NEGATIVE MG/DL
LEUKOCYTE ESTERASE UR QL STRIP.AUTO: NEGATIVE
LYMPHOCYTES # BLD: 0.6 K/UL (ref 0.8–3.5)
LYMPHOCYTES NFR BLD: 12 % (ref 12–49)
MAGNESIUM SERPL-MCNC: 2.1 MG/DL (ref 1.6–2.4)
MCH RBC QN AUTO: 33.1 PG (ref 26–34)
MCHC RBC AUTO-ENTMCNC: 30.9 G/DL (ref 30–36.5)
MCV RBC AUTO: 107 FL (ref 80–99)
MONOCYTES # BLD: 0.4 K/UL (ref 0–1)
MONOCYTES NFR BLD: 9 % (ref 5–13)
NEUTS SEG # BLD: 3.2 K/UL (ref 1.8–8)
NEUTS SEG NFR BLD: 69 % (ref 32–75)
NITRITE UR QL STRIP.AUTO: NEGATIVE
NRBC # BLD: 0 K/UL (ref 0–0.01)
NRBC BLD-RTO: 0 PER 100 WBC
O2/TOTAL GAS SETTING VFR VENT: 21 %
PCO2 BLD: 43.7 MMHG (ref 35–45)
PH BLD: 7.44 (ref 7.35–7.45)
PH UR STRIP: 5.5 (ref 5–8)
PLATELET # BLD AUTO: 142 K/UL (ref 150–400)
PMV BLD AUTO: 11.4 FL (ref 8.9–12.9)
PO2 BLD: 97 MMHG (ref 80–100)
POTASSIUM SERPL-SCNC: 4 MMOL/L (ref 3.5–5.1)
PROT SERPL-MCNC: 6.3 G/DL (ref 6.4–8.2)
PROT UR STRIP-MCNC: NEGATIVE MG/DL
PROTHROMBIN TIME: 11.2 SEC (ref 9–11.1)
RBC # BLD AUTO: 3.72 M/UL (ref 4.1–5.7)
RBC #/AREA URNS HPF: NORMAL /HPF (ref 0–5)
RBC MORPH BLD: ABNORMAL
SAO2 % BLD: 97.7 % (ref 92–97)
SODIUM SERPL-SCNC: 139 MMOL/L (ref 136–145)
SP GR UR REFRACTOMETRY: 1.02 (ref 1–1.03)
SPECIMEN EXP DATE BLD: NORMAL
SPECIMEN TYPE: ABNORMAL
THERAPEUTIC RANGE,PTTT: NORMAL SECS (ref 58–77)
TSH SERPL DL<=0.05 MIU/L-ACNC: 2.43 UIU/ML (ref 0.36–3.74)
UA: UC IF INDICATED,UAUC: NORMAL
UROBILINOGEN UR QL STRIP.AUTO: 1 EU/DL (ref 0.2–1)
WBC # BLD AUTO: 4.7 K/UL (ref 4.1–11.1)
WBC URNS QL MICRO: NORMAL /HPF (ref 0–4)

## 2023-02-01 PROCEDURE — 36600 WITHDRAWAL OF ARTERIAL BLOOD: CPT

## 2023-02-01 PROCEDURE — 36415 COLL VENOUS BLD VENIPUNCTURE: CPT

## 2023-02-01 PROCEDURE — 85730 THROMBOPLASTIN TIME PARTIAL: CPT

## 2023-02-01 PROCEDURE — 80053 COMPREHEN METABOLIC PANEL: CPT

## 2023-02-01 PROCEDURE — 84443 ASSAY THYROID STIM HORMONE: CPT

## 2023-02-01 PROCEDURE — 82803 BLOOD GASES ANY COMBINATION: CPT

## 2023-02-01 PROCEDURE — 85610 PROTHROMBIN TIME: CPT

## 2023-02-01 PROCEDURE — 93005 ELECTROCARDIOGRAM TRACING: CPT

## 2023-02-01 PROCEDURE — APPSS45 APP SPLIT SHARED TIME 31-45 MINUTES: Performed by: NURSE PRACTITIONER

## 2023-02-01 PROCEDURE — 83036 HEMOGLOBIN GLYCOSYLATED A1C: CPT

## 2023-02-01 PROCEDURE — 81001 URINALYSIS AUTO W/SCOPE: CPT

## 2023-02-01 PROCEDURE — 86900 BLOOD TYPING SEROLOGIC ABO: CPT

## 2023-02-01 PROCEDURE — 85025 COMPLETE CBC W/AUTO DIFF WBC: CPT

## 2023-02-01 PROCEDURE — 71046 X-RAY EXAM CHEST 2 VIEWS: CPT

## 2023-02-01 PROCEDURE — 83735 ASSAY OF MAGNESIUM: CPT

## 2023-02-01 RX ORDER — TRIAMCINOLONE ACETONIDE 1 MG/G
CREAM TOPICAL AS NEEDED
COMMUNITY

## 2023-02-01 NOTE — PERIOP NOTES
[facility]  CARDIAC SURGERY PREOPERATIVE INSTRUCTIONS    Surgery Date:   2/8/23    Your surgeon's office or [facility] staff will call you between 4 PM- 8 PM the day before surgery with your arrival time. If your surgery is on a Monday, you will receive a call the preceding Friday. Please report to [address] Patient Access/Admitting on the 1st floor. Bring your insurance card, photo identification, and any copayment (if applicable). If you are going home the same day of your surgery, you must have a responsible adult to drive you home. You need to have a responsible adult to stay with you the first 24 hours after surgery and you should not drive a car for 24 hours following your surgery. Nothing to eat or drink after midnight the night before surgery. This includes no water, gum, mints, coffee, juice, etc.    Do NOT drink alcohol or smoke 24 hours before surgery. STOP smoking for 14 days prior as it helps with breathing and healing after surgery. If you are being admitted to the hospital, please leave personal belongings/luggage in your car until you have an assigned hospital room number. Please wear comfortable clothes. Wear your glasses instead of contacts. We ask that all money, jewelry and valuables be left at home. Wear no make up, particularly mascara, the day of surgery. All body piercings, rings, and jewelry need to be removed and left at home. Please remove any nail polish or artificial nails from your fingernails. Please wear your hair loose or down. Please no pony-tails, buns, or any metal hair accessories. If you shower the morning of surgery, please do not apply any lotions or powders afterwards. You may wear deodorant, unless having breast surgery. Do not shave any body area within 24 hours of your surgery. Please follow all instructions to avoid any potential surgical cancellation.   Should your physical condition change, (i.e. fever, cold, flu, etc.) please notify your surgeon as soon as possible. It is important to be on time. If a situation occurs where you may be delayed, please call:  [phone] / [phone] on the day of surgery. The Preadmission Testing staff can be reached at [phone]. Special instructions: NONE    **Your surgeon will instruct you on which medications to continue/hold prior to surgery**      Incentive Spirometer        Using the incentive spirometer helps expand the small air sacs of your lungs, helps you breathe deeply, and helps improve your lung function. Use your incentive spirometer twice a day (10 breaths each time) prior to surgery. How to Use Your Incentive Spirometer:  Hold the incentive spirometer in an upright position. Breathe out as usual.   Place the mouthpiece in your mouth and seal your lips tightly around it. Take a deep breath. Breathe in slowly and as deeply as possible. Keep the blue flow rate guide between the arrows. Hold your breath as long as possible. Then exhale slowly and allow the piston to fall to the bottom of the column. Rest for a few seconds and repeat steps one through five at least 10 times. Preventing Infections Before and After - Your Surgery    IMPORTANT INSTRUCTIONS      You play an important role in your health and preparation for surgery. To reduce the germs on your skin you will need to shower with CHG soap (Chorhexidine gluconate 4%) two times before surgery. CHG soap (Hibiclens, Hex-A-Clens or store brand)  CHG soap will be provided at your Preadmission Testing (PAT) appointment. If you do not have a PAT appointment before surgery, you may arrange to  CHG soap from our office or purchase CHG soap at a pharmacy, grocery or department store. You need to purchase TWO 4 ounce bottles to use for your 2 showers. Steps to follow:  Sudie Loron your hair with your normal shampoo and your body with regular soap and rinse well to remove shampoo and soap from your skin.   Wet a clean washcloth and turn off the shower. Put CHG soap on washcloth and apply to your entire body from the neck down. Do not use on your head, face or private parts(genitals). Do not use CHG soap on open sores, wounds or areas of skin irritation. Wash you body gently for 5 minutes. Do not wash your skin too hard. This soap does not create lather. Pay special attention to your underarms and from your belly button to your feet. Turn the shower back on and rinse well to get CHG soap off your body. Pat your skin dry with a clean, dry towel. Do not apply lotions or moisturizer. Put on clean clothes and sleep on fresh bed sheets and do not allow pets to sleep with you. Shower with CHG soap 2 times before your surgery  The evening before your surgery  The morning of your surgery      Tips to help prevent infections after your surgery:  Protect your surgical wound from germs:  Hand washing is the most important thing you and your caregivers can do to prevent infections. Keep your bandage clean and dry! Do not touch your surgical wound. Use clean, freshly washed towels and washcloths every time you shower; do not share bath linens with others. Until your surgical wound is healed, wear clothing and sleep on bed linens each day that are clean and freshly washed. Do not allow pets to sleep in your bed with you or touch your surgical wound. Do not smoke - smoking delays wound healing. This may be a good time to stop smoking. If you have diabetes, it is important for you to manage your blood sugar levels properly before your surgery as well as after your surgery. Poorly managed blood sugar levels slow down wound healing and prevent you from healing completely. Patient Information Regarding COVID Restrictions    Day of Procedure    Please park in the parking deck or any designated visitor parking lot.   Enter the facility through the Main Entrance of the hospital.  On the day of surgery, please provide the cell phone number of the person who will be waiting for you to the Patient Access representative at the time of registration. Masks are highly recommended in the hospital, but not required. Once your procedure and the immediate recovery period is completed, a nurse in the recovery area will contact your designated visitor to inform them of your room number or to otherwise review other pertinent information regarding your care. Social distancing practices are strongly encouraged in waiting areas and the cafeteria. The patient was contacted  in person. He verbalized understanding of all instructions does not  need reinforcement.

## 2023-02-02 LAB
ATRIAL RATE: 97 BPM
CALCULATED P AXIS, ECG09: 70 DEGREES
CALCULATED R AXIS, ECG10: 71 DEGREES
CALCULATED T AXIS, ECG11: 75 DEGREES
DIAGNOSIS, 93000: NORMAL
P-R INTERVAL, ECG05: 140 MS
Q-T INTERVAL, ECG07: 350 MS
QRS DURATION, ECG06: 88 MS
QTC CALCULATION (BEZET), ECG08: 444 MS
VENTRICULAR RATE, ECG03: 97 BPM

## 2023-02-06 NOTE — H&P
CSS   History and Physical    Subjective:      Castillo Welch is a 81 y.o. male with PMHx of Aortic Stenosis, HTN, HLD, BPH, Hx of Esophageal CA s/p Esophagectomy in 2004, that is referred to the [address] [place] [address] by Dr. Steffanie Keene for interventional evaluation of  AS. He was last seen in the valve clinic in August 2022. At that time, he was mildly symptomatic and wasn't ready to proceed with TAVR. He returns today as his symptoms have been getting worse and he is now ready to proceed. He states that he has known about his murmur for many years and it has remained under surveillance. He only first noticed symptoms in June 2022. He was outside carrying a ladder and became dizzy and dyspneic. He stopped to rest and his symptoms resolved. Winter of 2021/22 he ran up 6 flights of stairs without much issue. He has never had syncope and no recent fall. He had continued to work as a  until June 2022. He also used to [employer] but hasn't done that in a while. Since we last saw him, he has been getting more winded when going up the stairs. He also has noted some episode which he describes as feeling \"out of body\" or a dizzy feeling. He denies LE edema, CP, Palpitations, orthopena, PND, GIB, or HF hospitalization in the last year. He recently retired to [place] area. He lives with his wife in [place]. She has some memory issues and he helps her with that. His sister-in-law also lives there and could help following a surgery. He is completely independent in his ADLs. He does not smoke, drink alcohol, or use recreational drugs. Cardiac Testing     7/5/22 Done at [facility] and scanned in  LVEF 50-55%  RANDY 0.7 cm2  MG 59 mmHg   mmHg  PV 5 m/s  Mild AI, Mild MR, Mild TR, Severe AS, Mild MS     Exercise stress test: 8/18/22  Interpretation Summary          Stress Test: A Yossi protocol stress test was performed.  Overall, the patient's exercise capacity was average for their age (6.0 METS). The patient reached stage 2 of the protocol after exercising for 4 min and 12 sec. Blood pressure demonstrated a normal response and heart rate demonstrated a normal response to stress. The patient's heart rate recovery was normal. The patient reported dyspnea, fatigue and no chest pain during the stress test.    ECG: Resting ECG demonstrates normal sinus rhythm, LVH with repolarization changes. ECG: Stress ECG was not diagnostic due to baseline ECG abnormality. Stress Findings     Resting ECG ECG is abnormal. The ECG shows sinus rhythm, LVH with repolarization changes. Stress ECG There were no arrhythmias during stress. The ECG was not diagnostic due to baseline ECG abnormality. Stress Test A Yossi protocol stress test was performed. Overall, the patient's exercise capacity was average for their age. The patient reached stage 2 of the protocol after exercising for 4 min and 12 sec. The patient experienced no angina during the test. Blood pressure demonstrated a normal response and heart rate demonstrated a normal response to stress. The patient's heart rate recovery was normal. The patient reported dyspnea, fatigue and no chest pain during the stress test. Onset of symptoms occurred at stage 2 of the protocol. The patient reached the end of the protocol and achieved the target heart rate. The patient requested the test to be stopped. Cardiac catheterization: 8/1/22  Conclusion     Catheters used:  5 Fr JL4, JR4 diagnostic catheters  Heparin was used for procedural anticoagulation  Uncomplicated ultrasound-guided access of the left radial artery   Successful hemostasis of the 6 Fr slender LRA access site using a TR band     1. 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 2. Severely calcified aortic valve in keeping with known diagnosis of aortic stenosis.      3. Given that the patient has recently noticed a lack of symptoms, we will do a treadmill exercise stress test to ensure that he is truly asymptomatic. 4. Results discussed with the patient and  the patient's family. Complications         Complications documented before study signed (8/1/2022 10:48 PM)       No complications were associated with this study. Documented by Gustavo Le MD - 8/1/2022 10:39 PM         Coronary Findings     Diagnostic  Dominance: Right  Left Main   The vessel was visualized by angiography. The vessel is angiographically normal.   Left Anterior Descending   Mid LAD lesion, 40% stenosed. Left Circumflex   Prox Cx lesion, 50% stenosed. First Obtuse Marginal Branch   The vessel is small. Second Obtuse Marginal Branch   The vessel is small. Third Obtuse Marginal Branch   The vessel is small. Right Coronary Artery   The vessel was visualized by angiography. There is mild disease. Intervention     No interventions have been documented. Past Medical History:   Diagnosis Date    Arthritis     BPH (benign prostatic hyperplasia)     Esophageal cancer (HCC)     GERD (gastroesophageal reflux disease)     Mixed hyperlipidemia     Nonrheumatic aortic valve stenosis     Primary hypertension     Psoriasis      Past Surgical History:   Procedure Laterality Date    HX CATARACT REMOVAL Bilateral     HX COLONOSCOPY      HX ENDOSCOPY      HX ESOPHAGECTOMY  2004    HX HERNIA REPAIR        Social History     Tobacco Use    Smoking status: Former     Years: 4.00     Types: Cigarettes    Smokeless tobacco: Never   Substance Use Topics    Alcohol use: Never      Family History   Problem Relation Age of Onset    Cancer Mother         breast    Dementia Mother     Heart Disease Father     Cancer Brother         ESOPHAGEAL CANCER AND \"OTHERS\"    Anesth Problems Neg Hx      Prior to Admission medications    Medication Sig Start Date End Date Taking?  Authorizing Provider   triamcinolone acetonide (KENALOG) 0.1 % topical cream Apply  to affected area as needed for Skin Irritation. use thin layer    Provider, Historical   aspirin delayed-release 81 mg tablet Take 1 Tablet by mouth in the morning. Indications: coronary artery disease 8/1/22   Kelly Montenegro MD   tamsulosin [facility]) 0.4 mg capsule Take 0.4 mg by mouth in the morning. Provider, Historical   simvastatin (ZOCOR) 20 mg tablet Take 20 mg by mouth nightly. Provider, Historical   mirabegron ER (MYRBETRIQ) 25 mg ER tablet Take 25 mg by mouth in the morning. Provider, Historical   multivitamin (ONE A DAY) tablet Take 1 Tablet by mouth in the morning. Provider, Historical   ubidecarenone/vitamin E mixed (COQ10  PO) Take 1 Tablet by mouth daily. Provider, Historical   vit A/vit C/vit E/zinc/copper (OCUVITE PRESERVISION PO) Take  by mouth daily.     Provider, Historical       No Known Allergies      Review of Systems:   Constitutional: Negative fever, negative chills, +fatigue  Eyes:               Negative for amauroses fugax, +glasses  ENT:                Negative sore throat,oral abscess   Endocrine        Negative for thyroid replacement Rx; goiter; DM  Respiratory:     Negative chronic cough,sputum production, +dyspnea on exertion  Cards:              Negative for palpitations, varicosities, claudication, lower extremity edema  GI:                   Negative for dysphagia, bleeding, nausea, vomiting, diarrhea, and abdominal pain  Genitourinary: Negative for frequency, dysuria, +BPH   Integument:     Negative for rash and pruritus  Hematologic:   Negative for easy bruising; bleeding dyscrasia   Musculoskel:   Negative for muscle weakness inhibiting ambulation, +chronic back pain  Neurological:   Negative for stroke, TIA, syncope, +dizziness  Behavl/Psych: Negative for feelings of anxiety, depression    Objective:     VS:   Wt Readings from Last 3 Encounters:   02/01/23 120 lb (54.4 kg)   01/05/23 120 lb 8 oz (54.7 kg)   08/18/22 120 lb (54.4 kg) Temp Readings from Last 3 Encounters:   02/01/23 97.8 °F (36.6 °C)   08/01/22 97.8 °F (36.6 °C)   07/25/22 98.1 °F (36.7 °C)     BP Readings from Last 3 Encounters:   02/01/23 (!) 160/80   01/05/23 113/71   08/01/22 (!) 117/58     Pulse Readings from Last 3 Encounters:   02/01/23 99   01/05/23 84   08/01/22 (!) 102         Physical Exam:    General: Well nourished well groomed male appearing stated age  Neuro: A&OX3. BAI. PERRL. Head:Normocephalic. Atraumatic. Symmetrical  Neck: Trachea Midline  Resp: CTA B. No Adv BS/cough/sputum/tachypnea with seated conversation  CV: S1S2 RRR. COURTNEY IV/VI. No JVD/carotid bruits. Pink/warm/dry extremities. No LE peripheral edema  GI:Benign ab. Soft. NT/ND. Active BS  : Voids  Integ: No obvious s/s of infection or breakdown  Musculo/Skeletal: FROM in all major joints. Good muscle tone    Labs:   Lab Results   Component Value Date/Time    WBC 4.7 02/01/2023 08:36 AM    HGB 12.3 02/01/2023 08:36 AM    HCT 39.8 02/01/2023 08:36 AM    PLATELET 083 (L) 29/86/7285 08:36 AM    .0 (H) 02/01/2023 08:36 AM     Lab Results   Component Value Date/Time    Sodium 139 02/01/2023 08:36 AM    Potassium 4.0 02/01/2023 08:36 AM    Chloride 103 02/01/2023 08:36 AM    CO2 34 (H) 02/01/2023 08:36 AM    Anion gap 2 (L) 02/01/2023 08:36 AM    Glucose 184 (H) 02/01/2023 08:36 AM    BUN 21 (H) 02/01/2023 08:36 AM    Creatinine 1.02 02/01/2023 08:36 AM    BUN/Creatinine ratio 21 (H) 02/01/2023 08:36 AM    Calcium 9.1 02/01/2023 08:36 AM    Bilirubin, total 0.4 02/01/2023 08:36 AM    Alk. phosphatase 78 02/01/2023 08:36 AM    Protein, total 6.3 (L) 02/01/2023 08:36 AM    Albumin 3.6 02/01/2023 08:36 AM    Globulin 2.7 02/01/2023 08:36 AM    A-G Ratio 1.3 02/01/2023 08:36 AM    ALT (SGPT) 18 02/01/2023 08:36 AM    AST (SGOT) 18 02/01/2023 08:36 AM         Diagnostics:   PA and lateral: 2/1/23  IMPRESSION  1. Lungs are hyperinflated but clear acute process.   2. Moderate to large hiatal hernia as described above. Carotid doppler: None    PFTS-FEV1: None    EKG:   EKG RESULTS       Procedure Component Value Units Date/Time    EKG, 12 LEAD, INITIAL [[id number]] Collected: 02/01/23 0837    Order Status: Completed Updated: 02/02/23 1145     Ventricular Rate 97 BPM      Atrial Rate 97 BPM      P-R Interval 140 ms      QRS Duration 88 ms      Q-T Interval 350 ms      QTC Calculation (Bezet) 444 ms      Calculated P Axis 70 degrees      Calculated R Axis 71 degrees      Calculated T Axis 75 degrees      Diagnosis --     Normal sinus rhythm  Voltage criteria for left ventricular hypertrophy  Nonspecific ST abnormality    No previous ECGs available  Confirmed by Ruel Smyth M.D., Rodolfo Cancino ([id number]) on 2/2/2023 11:44:58 AM              Assessment:     Active Problems:    Nonrheumatic aortic valve stenosis (8/1/2022)      Plan:   The risk and benefit of surgery were reviewed with patient and family and all questions answered and the patient wishes to proceed. Risk include infection, bleeding, stroke, heart attack, irregular heart rhythm, kidney failure and death. The patient was given instructions. Surgery is scheduled for 2/8/23. STS 4.2 Risk Score / Predicted 30 day mortality: - calculations scanned into EMR  1.478%/8.219%    Treatment Plan: Aortic Stenosis-severe and symptomatic: Intermediate surgical risk. Plan for RTF 26 S3 TAVR on 2/8/23. ASA following TAVR.    HTN: Not currently on antihypertensives  HLD: Statin  BPH: Flomax  Hx of Esophageal CA s/p Esophagectomy 2004: No current treatment       Signed By: Chantal Luevano NP     February 6, 2023

## 2023-02-06 NOTE — CONSENT
Informed Consent for Blood Component Transfusion Note    I have discussed with the patient the rationale for blood component transfusion; its benefits in treating or preventing fatigue, organ damage, or death; and its risk which includes mild transfusion reactions, rare risk of blood borne infection, or more serious but rare reactions. I have discussed the alternatives to transfusion, including the risk and consequences of not receiving transfusion. The patient had an opportunity to ask questions and had agreed to proceed with transfusion of blood components.     Electronically signed by Isela Pickett NP on 2/6/23 at 9:06 AM

## 2023-02-07 ENCOUNTER — TELEPHONE (OUTPATIENT)
Dept: CASE MANAGEMENT | Age: 83
End: 2023-02-07

## 2023-02-07 NOTE — TELEPHONE ENCOUNTER
Cardiac Surgery Care Coordinator-  Called Christiano Rosario to review plan of care and day of surgery expectations. Encouraged Christiano Rosario to verbalize and offered emotional support. Christiano Rosario is without questions or concerns at this time.   Elvin Kulkarni, RN

## 2023-02-08 ENCOUNTER — APPOINTMENT (OUTPATIENT)
Dept: GENERAL RADIOLOGY | Age: 83
End: 2023-02-08
Attending: THORACIC SURGERY (CARDIOTHORACIC VASCULAR SURGERY)
Payer: MEDICARE

## 2023-02-08 ENCOUNTER — HOSPITAL ENCOUNTER (INPATIENT)
Age: 83
LOS: 2 days | Discharge: NURSING, REHAB OR LONG-TERM CARE | End: 2023-02-10
Attending: THORACIC SURGERY (CARDIOTHORACIC VASCULAR SURGERY) | Admitting: THORACIC SURGERY (CARDIOTHORACIC VASCULAR SURGERY)
Payer: MEDICARE

## 2023-02-08 ENCOUNTER — APPOINTMENT (OUTPATIENT)
Dept: CT IMAGING | Age: 83
End: 2023-02-08
Attending: NURSE PRACTITIONER
Payer: MEDICARE

## 2023-02-08 ENCOUNTER — ANESTHESIA EVENT (OUTPATIENT)
Dept: CARDIOTHORACIC SURGERY | Age: 83
End: 2023-02-08
Payer: MEDICARE

## 2023-02-08 ENCOUNTER — APPOINTMENT (OUTPATIENT)
Dept: NON INVASIVE DIAGNOSTICS | Age: 83
End: 2023-02-08
Attending: THORACIC SURGERY (CARDIOTHORACIC VASCULAR SURGERY)
Payer: MEDICARE

## 2023-02-08 ENCOUNTER — APPOINTMENT (OUTPATIENT)
Dept: GENERAL RADIOLOGY | Age: 83
End: 2023-02-08
Attending: NURSE PRACTITIONER
Payer: MEDICARE

## 2023-02-08 ENCOUNTER — OFFICE VISIT (OUTPATIENT)
Dept: CARDIOLOGY CLINIC | Age: 83
End: 2023-02-08

## 2023-02-08 ENCOUNTER — ANESTHESIA (OUTPATIENT)
Dept: CARDIOTHORACIC SURGERY | Age: 83
End: 2023-02-08
Payer: MEDICARE

## 2023-02-08 ENCOUNTER — TRANSCRIBE ORDER (OUTPATIENT)
Dept: CARDIAC REHAB | Age: 83
End: 2023-02-08

## 2023-02-08 DIAGNOSIS — Z95.4 S/P AORTIC VALVE ALLOGRAFT: Primary | ICD-10-CM

## 2023-02-08 DIAGNOSIS — I35.0 NONRHEUMATIC AORTIC VALVE STENOSIS: Primary | ICD-10-CM

## 2023-02-08 DIAGNOSIS — I35.0 AORTIC STENOSIS, SEVERE: Primary | ICD-10-CM

## 2023-02-08 LAB
ALBUMIN SERPL-MCNC: 2.9 G/DL (ref 3.5–5)
ALBUMIN/GLOB SERPL: 1.3 (ref 1.1–2.2)
ALP SERPL-CCNC: 60 U/L (ref 45–117)
ALT SERPL-CCNC: 13 U/L (ref 12–78)
ANION GAP SERPL CALC-SCNC: 5 MMOL/L (ref 5–15)
APTT PPP: 31.2 SEC (ref 22.1–31)
ARTERIAL PATENCY WRIST A: ABNORMAL
AST SERPL-CCNC: 18 U/L (ref 15–37)
ATRIAL RATE: 73 BPM
BASE EXCESS BLD CALC-SCNC: 2.9 MMOL/L
BASOPHILS # BLD: 0 K/UL (ref 0–0.1)
BASOPHILS NFR BLD: 0 % (ref 0–1)
BDY SITE: ABNORMAL
BILIRUB SERPL-MCNC: 0.3 MG/DL (ref 0.2–1)
BNP SERPL-MCNC: 3333 PG/ML
BUN SERPL-MCNC: 18 MG/DL (ref 6–20)
BUN/CREAT SERPL: 25 (ref 12–20)
CALCIUM SERPL-MCNC: 8.5 MG/DL (ref 8.5–10.1)
CALCULATED P AXIS, ECG09: 65 DEGREES
CALCULATED R AXIS, ECG10: 58 DEGREES
CALCULATED T AXIS, ECG11: -43 DEGREES
CHLORIDE SERPL-SCNC: 105 MMOL/L (ref 97–108)
CO2 SERPL-SCNC: 27 MMOL/L (ref 21–32)
CREAT SERPL-MCNC: 0.73 MG/DL (ref 0.7–1.3)
DIAGNOSIS, 93000: NORMAL
DIFFERENTIAL METHOD BLD: ABNORMAL
EOSINOPHIL # BLD: 0.5 K/UL (ref 0–0.4)
EOSINOPHIL NFR BLD: 8 % (ref 0–7)
ERYTHROCYTE [DISTWIDTH] IN BLOOD BY AUTOMATED COUNT: 14.1 % (ref 11.5–14.5)
GAS FLOW.O2 O2 DELIVERY SYS: ABNORMAL
GLOBULIN SER CALC-MCNC: 2.3 G/DL (ref 2–4)
GLUCOSE BLD STRIP.AUTO-MCNC: 87 MG/DL (ref 65–117)
GLUCOSE SERPL-MCNC: 94 MG/DL (ref 65–100)
HCO3 BLD-SCNC: 28.5 MMOL/L (ref 22–26)
HCT VFR BLD AUTO: 31.4 % (ref 36.6–50.3)
HGB BLD-MCNC: 10.2 G/DL (ref 12.1–17)
IMM GRANULOCYTES # BLD AUTO: 0.1 K/UL (ref 0–0.04)
IMM GRANULOCYTES NFR BLD AUTO: 2 % (ref 0–0.5)
INR PPP: 1.2 (ref 0.9–1.1)
LYMPHOCYTES # BLD: 0.5 K/UL (ref 0.8–3.5)
LYMPHOCYTES NFR BLD: 7 % (ref 12–49)
MCH RBC QN AUTO: 33.8 PG (ref 26–34)
MCHC RBC AUTO-ENTMCNC: 32.5 G/DL (ref 30–36.5)
MCV RBC AUTO: 104 FL (ref 80–99)
MONOCYTES # BLD: 0.5 K/UL (ref 0–1)
MONOCYTES NFR BLD: 7 % (ref 5–13)
NEUTS SEG # BLD: 5.1 K/UL (ref 1.8–8)
NEUTS SEG NFR BLD: 76 % (ref 32–75)
NRBC # BLD: 0 K/UL (ref 0–0.01)
NRBC BLD-RTO: 0 PER 100 WBC
P-R INTERVAL, ECG05: 152 MS
PCO2 BLD: 47.6 MMHG (ref 35–45)
PH BLD: 7.39 (ref 7.35–7.45)
PLATELET # BLD AUTO: 112 K/UL (ref 150–400)
PLATELET COMMENTS,PCOM: ABNORMAL
PMV BLD AUTO: 11.1 FL (ref 8.9–12.9)
PO2 BLD: 89 MMHG (ref 80–100)
POTASSIUM SERPL-SCNC: 3.9 MMOL/L (ref 3.5–5.1)
PROT SERPL-MCNC: 5.2 G/DL (ref 6.4–8.2)
PROTHROMBIN TIME: 12.4 SEC (ref 9–11.1)
Q-T INTERVAL, ECG07: 410 MS
QRS DURATION, ECG06: 88 MS
QTC CALCULATION (BEZET), ECG08: 451 MS
RBC # BLD AUTO: 3.02 M/UL (ref 4.1–5.7)
RBC MORPH BLD: ABNORMAL
RBC MORPH BLD: ABNORMAL
SAO2 % BLD: 96.6 % (ref 92–97)
SERVICE CMNT-IMP: NORMAL
SODIUM SERPL-SCNC: 137 MMOL/L (ref 136–145)
SPECIMEN TYPE: ABNORMAL
THERAPEUTIC RANGE,PTTT: ABNORMAL SECS (ref 58–77)
VENTRICULAR RATE, ECG03: 73 BPM
WBC # BLD AUTO: 6.7 K/UL (ref 4.1–11.1)

## 2023-02-08 PROCEDURE — B4101ZZ FLUOROSCOPY OF ABDOMINAL AORTA USING LOW OSMOLAR CONTRAST: ICD-10-PCS | Performed by: INTERNAL MEDICINE

## 2023-02-08 PROCEDURE — 74011250636 HC RX REV CODE- 250/636: Performed by: ANESTHESIOLOGY

## 2023-02-08 PROCEDURE — C1894 INTRO/SHEATH, NON-LASER: HCPCS | Performed by: INTERNAL MEDICINE

## 2023-02-08 PROCEDURE — 85025 COMPLETE CBC W/AUTO DIFF WBC: CPT

## 2023-02-08 PROCEDURE — 74011000636 HC RX REV CODE- 636: Performed by: RADIOLOGY

## 2023-02-08 PROCEDURE — 02RF38Z REPLACEMENT OF AORTIC VALVE WITH ZOOPLASTIC TISSUE, PERCUTANEOUS APPROACH: ICD-10-PCS | Performed by: INTERNAL MEDICINE

## 2023-02-08 PROCEDURE — 76210000006 HC OR PH I REC 0.5 TO 1 HR: Performed by: INTERNAL MEDICINE

## 2023-02-08 PROCEDURE — 77030004538 HC CATH ANGI DX MP BSC -A: Performed by: INTERNAL MEDICINE

## 2023-02-08 PROCEDURE — 70450 CT HEAD/BRAIN W/O DYE: CPT

## 2023-02-08 PROCEDURE — 80053 COMPREHEN METABOLIC PANEL: CPT

## 2023-02-08 PROCEDURE — C1760 CLOSURE DEV, VASC: HCPCS | Performed by: INTERNAL MEDICINE

## 2023-02-08 PROCEDURE — C1769 GUIDE WIRE: HCPCS | Performed by: INTERNAL MEDICINE

## 2023-02-08 PROCEDURE — 74011000250 HC RX REV CODE- 250: Performed by: NURSE PRACTITIONER

## 2023-02-08 PROCEDURE — 71045 X-RAY EXAM CHEST 1 VIEW: CPT

## 2023-02-08 PROCEDURE — 65660000001 HC RM ICU INTERMED STEPDOWN

## 2023-02-08 PROCEDURE — 4A03X5D MEASUREMENT OF ARTERIAL FLOW, INTRACRANIAL, EXTERNAL APPROACH: ICD-10-PCS | Performed by: RADIOLOGY

## 2023-02-08 PROCEDURE — 0042T CT CODE NEURO PERF W CBF: CPT

## 2023-02-08 PROCEDURE — 33361 REPLACE AORTIC VALVE PERQ: CPT | Performed by: THORACIC SURGERY (CARDIOTHORACIC VASCULAR SURGERY)

## 2023-02-08 PROCEDURE — 93005 ELECTROCARDIOGRAM TRACING: CPT

## 2023-02-08 PROCEDURE — 82803 BLOOD GASES ANY COMBINATION: CPT

## 2023-02-08 PROCEDURE — 70496 CT ANGIOGRAPHY HEAD: CPT

## 2023-02-08 PROCEDURE — B41C1ZZ FLUOROSCOPY OF PELVIC ARTERIES USING LOW OSMOLAR CONTRAST: ICD-10-PCS | Performed by: INTERNAL MEDICINE

## 2023-02-08 PROCEDURE — 2709999900 HC NON-CHARGEABLE SUPPLY: Performed by: INTERNAL MEDICINE

## 2023-02-08 PROCEDURE — 74011000250 HC RX REV CODE- 250: Performed by: INTERNAL MEDICINE

## 2023-02-08 PROCEDURE — 76010000109 HC CV SURG 2.5 TO 3 HR: Performed by: INTERNAL MEDICINE

## 2023-02-08 PROCEDURE — 77030040934 HC CATH DIAG DXTERITY MEDT -A: Performed by: INTERNAL MEDICINE

## 2023-02-08 PROCEDURE — 74011250636 HC RX REV CODE- 250/636: Performed by: NURSE PRACTITIONER

## 2023-02-08 PROCEDURE — C1725 CATH, TRANSLUMIN NON-LASER: HCPCS | Performed by: INTERNAL MEDICINE

## 2023-02-08 PROCEDURE — 76060000036 HC ANESTHESIA 2.5 TO 3 HR: Performed by: INTERNAL MEDICINE

## 2023-02-08 PROCEDURE — 74011250637 HC RX REV CODE- 250/637: Performed by: ANESTHESIOLOGY

## 2023-02-08 PROCEDURE — 85610 PROTHROMBIN TIME: CPT

## 2023-02-08 PROCEDURE — 36415 COLL VENOUS BLD VENIPUNCTURE: CPT

## 2023-02-08 PROCEDURE — 83880 ASSAY OF NATRIURETIC PEPTIDE: CPT

## 2023-02-08 PROCEDURE — 74011000636 HC RX REV CODE- 636: Performed by: INTERNAL MEDICINE

## 2023-02-08 PROCEDURE — 82962 GLUCOSE BLOOD TEST: CPT

## 2023-02-08 PROCEDURE — 85730 THROMBOPLASTIN TIME PARTIAL: CPT

## 2023-02-08 PROCEDURE — 77030004549 HC CATH ANGI DX PRF MRTM -A: Performed by: INTERNAL MEDICINE

## 2023-02-08 PROCEDURE — 93308 TTE F-UP OR LMTD: CPT

## 2023-02-08 PROCEDURE — 77030037496 HC VLV AORT TRNSCTH -L: Performed by: INTERNAL MEDICINE

## 2023-02-08 PROCEDURE — 74011000250 HC RX REV CODE- 250: Performed by: ANESTHESIOLOGY

## 2023-02-08 PROCEDURE — 77030019702 HC WRP THER MENM -C: Performed by: INTERNAL MEDICINE

## 2023-02-08 PROCEDURE — 74011250637 HC RX REV CODE- 250/637: Performed by: NURSE PRACTITIONER

## 2023-02-08 PROCEDURE — 77030041244 HC CBL PACE EXT TEMP REMG -B: Performed by: INTERNAL MEDICINE

## 2023-02-08 DEVICE — VALVE HRT TRANSCATH 26MM SAPIEN 3 ULTRA: Type: IMPLANTABLE DEVICE | Site: AORTIC VALVE | Status: FUNCTIONAL

## 2023-02-08 RX ORDER — FACIAL-BODY WIPES
10 EACH TOPICAL DAILY PRN
Status: DISCONTINUED | OUTPATIENT
Start: 2023-02-08 | End: 2023-02-10 | Stop reason: HOSPADM

## 2023-02-08 RX ORDER — ONDANSETRON 2 MG/ML
4 INJECTION INTRAMUSCULAR; INTRAVENOUS
Status: DISCONTINUED | OUTPATIENT
Start: 2023-02-08 | End: 2023-02-10 | Stop reason: HOSPADM

## 2023-02-08 RX ORDER — FENTANYL CITRATE 50 UG/ML
50 INJECTION, SOLUTION INTRAMUSCULAR; INTRAVENOUS AS NEEDED
Status: DISCONTINUED | OUTPATIENT
Start: 2023-02-08 | End: 2023-02-08 | Stop reason: HOSPADM

## 2023-02-08 RX ORDER — DIPHENHYDRAMINE HYDROCHLORIDE 50 MG/ML
12.5 INJECTION, SOLUTION INTRAMUSCULAR; INTRAVENOUS AS NEEDED
Status: DISCONTINUED | OUTPATIENT
Start: 2023-02-08 | End: 2023-02-08 | Stop reason: HOSPADM

## 2023-02-08 RX ORDER — SODIUM CHLORIDE, SODIUM LACTATE, POTASSIUM CHLORIDE, CALCIUM CHLORIDE 600; 310; 30; 20 MG/100ML; MG/100ML; MG/100ML; MG/100ML
100 INJECTION, SOLUTION INTRAVENOUS CONTINUOUS
Status: DISCONTINUED | OUTPATIENT
Start: 2023-02-08 | End: 2023-02-08 | Stop reason: HOSPADM

## 2023-02-08 RX ORDER — NALOXONE HYDROCHLORIDE 0.4 MG/ML
0.4 INJECTION, SOLUTION INTRAMUSCULAR; INTRAVENOUS; SUBCUTANEOUS AS NEEDED
Status: DISCONTINUED | OUTPATIENT
Start: 2023-02-08 | End: 2023-02-10 | Stop reason: HOSPADM

## 2023-02-08 RX ORDER — SODIUM CHLORIDE 0.9 % (FLUSH) 0.9 %
5-40 SYRINGE (ML) INJECTION AS NEEDED
Status: DISCONTINUED | OUTPATIENT
Start: 2023-02-08 | End: 2023-02-10 | Stop reason: HOSPADM

## 2023-02-08 RX ORDER — SODIUM CHLORIDE 9 MG/ML
25 INJECTION, SOLUTION INTRAVENOUS CONTINUOUS
Status: DISCONTINUED | OUTPATIENT
Start: 2023-02-08 | End: 2023-02-08 | Stop reason: HOSPADM

## 2023-02-08 RX ORDER — ACETAMINOPHEN 325 MG/1
650 TABLET ORAL
Status: DISCONTINUED | OUTPATIENT
Start: 2023-02-08 | End: 2023-02-10 | Stop reason: HOSPADM

## 2023-02-08 RX ORDER — FENTANYL CITRATE 50 UG/ML
25 INJECTION, SOLUTION INTRAMUSCULAR; INTRAVENOUS
Status: DISCONTINUED | OUTPATIENT
Start: 2023-02-08 | End: 2023-02-08 | Stop reason: HOSPADM

## 2023-02-08 RX ORDER — PROTAMINE SULFATE 10 MG/ML
INJECTION, SOLUTION INTRAVENOUS AS NEEDED
Status: DISCONTINUED | OUTPATIENT
Start: 2023-02-08 | End: 2023-02-08 | Stop reason: HOSPADM

## 2023-02-08 RX ORDER — TRAMADOL HYDROCHLORIDE 50 MG/1
50-100 TABLET ORAL
Status: DISCONTINUED | OUTPATIENT
Start: 2023-02-08 | End: 2023-02-10 | Stop reason: HOSPADM

## 2023-02-08 RX ORDER — SODIUM CHLORIDE 0.9 % (FLUSH) 0.9 %
5-40 SYRINGE (ML) INJECTION AS NEEDED
Status: DISCONTINUED | OUTPATIENT
Start: 2023-02-08 | End: 2023-02-08 | Stop reason: HOSPADM

## 2023-02-08 RX ORDER — TAMSULOSIN HYDROCHLORIDE 0.4 MG/1
0.4 CAPSULE ORAL DAILY
Status: DISCONTINUED | OUTPATIENT
Start: 2023-02-09 | End: 2023-02-10 | Stop reason: HOSPADM

## 2023-02-08 RX ORDER — LANOLIN ALCOHOL/MO/W.PET/CERES
3 CREAM (GRAM) TOPICAL
Status: DISCONTINUED | OUTPATIENT
Start: 2023-02-08 | End: 2023-02-10 | Stop reason: HOSPADM

## 2023-02-08 RX ORDER — TROSPIUM CHLORIDE 20 MG/1
20 TABLET, FILM COATED ORAL DAILY
Status: DISCONTINUED | OUTPATIENT
Start: 2023-02-09 | End: 2023-02-10 | Stop reason: HOSPADM

## 2023-02-08 RX ORDER — MIDAZOLAM HYDROCHLORIDE 1 MG/ML
INJECTION, SOLUTION INTRAMUSCULAR; INTRAVENOUS AS NEEDED
Status: DISCONTINUED | OUTPATIENT
Start: 2023-02-08 | End: 2023-02-08 | Stop reason: HOSPADM

## 2023-02-08 RX ORDER — OXYCODONE AND ACETAMINOPHEN 5; 325 MG/1; MG/1
1-2 TABLET ORAL
Status: DISCONTINUED | OUTPATIENT
Start: 2023-02-08 | End: 2023-02-10 | Stop reason: HOSPADM

## 2023-02-08 RX ORDER — SODIUM CHLORIDE 0.9 % (FLUSH) 0.9 %
5-40 SYRINGE (ML) INJECTION EVERY 8 HOURS
Status: DISCONTINUED | OUTPATIENT
Start: 2023-02-08 | End: 2023-02-10 | Stop reason: HOSPADM

## 2023-02-08 RX ORDER — SODIUM CHLORIDE 0.9 % (FLUSH) 0.9 %
5-40 SYRINGE (ML) INJECTION EVERY 8 HOURS
Status: DISCONTINUED | OUTPATIENT
Start: 2023-02-08 | End: 2023-02-08 | Stop reason: HOSPADM

## 2023-02-08 RX ORDER — LIDOCAINE HYDROCHLORIDE 10 MG/ML
INJECTION INFILTRATION; PERINEURAL AS NEEDED
Status: DISCONTINUED | OUTPATIENT
Start: 2023-02-08 | End: 2023-02-08 | Stop reason: HOSPADM

## 2023-02-08 RX ORDER — MIDAZOLAM HYDROCHLORIDE 1 MG/ML
1 INJECTION, SOLUTION INTRAMUSCULAR; INTRAVENOUS AS NEEDED
Status: DISCONTINUED | OUTPATIENT
Start: 2023-02-08 | End: 2023-02-08 | Stop reason: HOSPADM

## 2023-02-08 RX ORDER — HEPARIN SODIUM 1000 [USP'U]/ML
INJECTION, SOLUTION INTRAVENOUS; SUBCUTANEOUS AS NEEDED
Status: DISCONTINUED | OUTPATIENT
Start: 2023-02-08 | End: 2023-02-08 | Stop reason: HOSPADM

## 2023-02-08 RX ORDER — AMOXICILLIN 250 MG
1 CAPSULE ORAL 2 TIMES DAILY
Status: DISCONTINUED | OUTPATIENT
Start: 2023-02-09 | End: 2023-02-10 | Stop reason: HOSPADM

## 2023-02-08 RX ORDER — GUAIFENESIN 100 MG/5ML
81 LIQUID (ML) ORAL DAILY
Status: DISCONTINUED | OUTPATIENT
Start: 2023-02-09 | End: 2023-02-10 | Stop reason: HOSPADM

## 2023-02-08 RX ORDER — SODIUM CHLORIDE, SODIUM LACTATE, POTASSIUM CHLORIDE, CALCIUM CHLORIDE 600; 310; 30; 20 MG/100ML; MG/100ML; MG/100ML; MG/100ML
25 INJECTION, SOLUTION INTRAVENOUS CONTINUOUS
Status: DISCONTINUED | OUTPATIENT
Start: 2023-02-08 | End: 2023-02-08 | Stop reason: HOSPADM

## 2023-02-08 RX ORDER — HYDRALAZINE HYDROCHLORIDE 20 MG/ML
10 INJECTION INTRAMUSCULAR; INTRAVENOUS
Status: DISCONTINUED | OUTPATIENT
Start: 2023-02-08 | End: 2023-02-10 | Stop reason: HOSPADM

## 2023-02-08 RX ORDER — LIDOCAINE HYDROCHLORIDE 10 MG/ML
0.1 INJECTION, SOLUTION EPIDURAL; INFILTRATION; INTRACAUDAL; PERINEURAL AS NEEDED
Status: DISCONTINUED | OUTPATIENT
Start: 2023-02-08 | End: 2023-02-08 | Stop reason: HOSPADM

## 2023-02-08 RX ORDER — ACETAMINOPHEN 325 MG/1
650 TABLET ORAL ONCE
Status: COMPLETED | OUTPATIENT
Start: 2023-02-08 | End: 2023-02-08

## 2023-02-08 RX ORDER — MORPHINE SULFATE 2 MG/ML
2 INJECTION, SOLUTION INTRAMUSCULAR; INTRAVENOUS
Status: DISCONTINUED | OUTPATIENT
Start: 2023-02-08 | End: 2023-02-08 | Stop reason: HOSPADM

## 2023-02-08 RX ORDER — HYDROMORPHONE HYDROCHLORIDE 1 MG/ML
0.2 INJECTION, SOLUTION INTRAMUSCULAR; INTRAVENOUS; SUBCUTANEOUS
Status: DISCONTINUED | OUTPATIENT
Start: 2023-02-08 | End: 2023-02-08 | Stop reason: HOSPADM

## 2023-02-08 RX ORDER — OXYCODONE HYDROCHLORIDE 5 MG/1
5 TABLET ORAL AS NEEDED
Status: DISCONTINUED | OUTPATIENT
Start: 2023-02-08 | End: 2023-02-08 | Stop reason: HOSPADM

## 2023-02-08 RX ORDER — MIDAZOLAM HYDROCHLORIDE 1 MG/ML
0.5 INJECTION, SOLUTION INTRAMUSCULAR; INTRAVENOUS
Status: DISCONTINUED | OUTPATIENT
Start: 2023-02-08 | End: 2023-02-08 | Stop reason: HOSPADM

## 2023-02-08 RX ORDER — PROPOFOL 10 MG/ML
INJECTION, EMULSION INTRAVENOUS
Status: DISCONTINUED | OUTPATIENT
Start: 2023-02-08 | End: 2023-02-08 | Stop reason: HOSPADM

## 2023-02-08 RX ORDER — ROPIVACAINE HYDROCHLORIDE 5 MG/ML
30 INJECTION, SOLUTION EPIDURAL; INFILTRATION; PERINEURAL AS NEEDED
Status: DISCONTINUED | OUTPATIENT
Start: 2023-02-08 | End: 2023-02-08 | Stop reason: HOSPADM

## 2023-02-08 RX ORDER — MORPHINE SULFATE 2 MG/ML
2 INJECTION, SOLUTION INTRAMUSCULAR; INTRAVENOUS
Status: DISCONTINUED | OUTPATIENT
Start: 2023-02-08 | End: 2023-02-10 | Stop reason: HOSPADM

## 2023-02-08 RX ORDER — ALBUTEROL SULFATE 0.83 MG/ML
2.5 SOLUTION RESPIRATORY (INHALATION)
Status: DISCONTINUED | OUTPATIENT
Start: 2023-02-08 | End: 2023-02-10 | Stop reason: HOSPADM

## 2023-02-08 RX ORDER — ONDANSETRON 2 MG/ML
4 INJECTION INTRAMUSCULAR; INTRAVENOUS AS NEEDED
Status: DISCONTINUED | OUTPATIENT
Start: 2023-02-08 | End: 2023-02-08 | Stop reason: HOSPADM

## 2023-02-08 RX ORDER — DEXMEDETOMIDINE HYDROCHLORIDE 100 UG/ML
INJECTION, SOLUTION INTRAVENOUS AS NEEDED
Status: DISCONTINUED | OUTPATIENT
Start: 2023-02-08 | End: 2023-02-08 | Stop reason: HOSPADM

## 2023-02-08 RX ADMIN — WATER 2 G: 1 INJECTION INTRAMUSCULAR; INTRAVENOUS; SUBCUTANEOUS at 07:58

## 2023-02-08 RX ADMIN — ACETAMINOPHEN 650 MG: 325 TABLET ORAL at 06:22

## 2023-02-08 RX ADMIN — DEXMEDETOMIDINE HYDROCHLORIDE 18 MCG: 100 INJECTION, SOLUTION, CONCENTRATE INTRAVENOUS at 09:34

## 2023-02-08 RX ADMIN — PROTAMINE SULFATE 100 MG: 10 INJECTION, SOLUTION INTRAVENOUS at 09:24

## 2023-02-08 RX ADMIN — SODIUM CHLORIDE, POTASSIUM CHLORIDE, SODIUM LACTATE AND CALCIUM CHLORIDE 25 ML/HR: 600; 310; 30; 20 INJECTION, SOLUTION INTRAVENOUS at 06:59

## 2023-02-08 RX ADMIN — HEPARIN SODIUM 4000 UNITS: 1000 INJECTION, SOLUTION INTRAVENOUS; SUBCUTANEOUS at 08:46

## 2023-02-08 RX ADMIN — MIDAZOLAM 2 MG: 1 INJECTION INTRAMUSCULAR; INTRAVENOUS at 07:11

## 2023-02-08 RX ADMIN — PROPOFOL 50 MCG/KG/MIN: 10 INJECTION, EMULSION INTRAVENOUS at 07:39

## 2023-02-08 RX ADMIN — IOPAMIDOL 100 ML: 755 INJECTION, SOLUTION INTRAVENOUS at 17:22

## 2023-02-08 RX ADMIN — CEFAZOLIN 2 G: 1 INJECTION, POWDER, FOR SOLUTION INTRAMUSCULAR; INTRAVENOUS at 22:09

## 2023-02-08 RX ADMIN — SODIUM CHLORIDE, PRESERVATIVE FREE 10 ML: 5 INJECTION INTRAVENOUS at 22:17

## 2023-02-08 RX ADMIN — DEXMEDETOMIDINE HYDROCHLORIDE 12 MCG: 100 INJECTION, SOLUTION, CONCENTRATE INTRAVENOUS at 08:00

## 2023-02-08 RX ADMIN — IOPAMIDOL 20 ML: 755 INJECTION, SOLUTION INTRAVENOUS at 17:23

## 2023-02-08 RX ADMIN — SODIUM CHLORIDE 30 MCG/MIN: 9 INJECTION, SOLUTION INTRAVENOUS at 07:39

## 2023-02-08 RX ADMIN — DEXMEDETOMIDINE HYDROCHLORIDE 10 MCG: 100 INJECTION, SOLUTION, CONCENTRATE INTRAVENOUS at 09:14

## 2023-02-08 RX ADMIN — Medication 3 MG: at 22:09

## 2023-02-08 RX ADMIN — HEPARIN SODIUM 6000 UNITS: 1000 INJECTION, SOLUTION INTRAVENOUS; SUBCUTANEOUS at 08:36

## 2023-02-08 NOTE — PROGRESS NOTES
Cardiac Surgery Coordinator- Met with the family of Cortney Conde, introduced role of the Cardiac Surgery Care Coordinator. Reviewed plan of care and day of procedure expectations. Provided family with an update from OR. Encouraged family to verbalize and emotional support given. Will continue to update throughout the day. 1000- Met with Char Mtfacundo Walden's family and Dr. Jeffrey Rain. Update given, Encouraged family to verbalize and offered emotional support. Reinforced Surgical waiting room instructions, family to wait in the main surgical waiting room until contacted by the nursing staff. 1400- Unable to locate family, placed call to Mrs Poncho Meier. She stated she has gone home for the evening. Provided her with his room number and discussed plan of care. Will continue to follow.

## 2023-02-08 NOTE — PROGRESS NOTES
Clinical Update:      Rosalind Owens arrived to PACU from OR s/p transcatheter aortic valve replacement with RTF 26 mm + 1 ml extra volume S3 by Nacho Holland and Huma on 02/08/23. he  had MAC anesthesia. Patient is awake and oriented. Bilateral groin sites soft with gauze dressing-clean, dry, intact. PPP. Anticoagulation plan for ASA. Plan for postoperative TTE and EKG.     Visit Vitals  /60   Pulse 74   Temp 97.8 °F (36.6 °C)   Resp 24   Ht 5' 10\" (1.778 m)   Wt 120 lb (54.4 kg)   SpO2 99%   BMI 17.22 kg/m²         Signed:  Maggi Rodríguez NP  2/8/2023  2:03 PM

## 2023-02-08 NOTE — OP NOTES
INTERVENTIONAL CARDIOLOGY OPERATIVE NOTE: TRANSFEMORAL LIVE 3 ULTRA TAVR      Patient: Marlys Griffin  YOB: 1940  MRN: [id number]    Date of Procedure:  2/8/2023    PREOPERATIVE DIAGNOSIS:  Symptomatic severe aortic valve stenosis    POSTOPERATIVE DIAGNOSIS:  Symptomatic severe aortic valve stenosis    PROCEDURES PERFORMED:   1. Implantation of catheter-delivered prosthetic aortic heart valve (26 mm Live 3 Ultra + 1 mL extra volume); percutaneous right femoral artery approach (CPT 40628). 2. Percutaneous left femoral artery access under fluoroscopic and ultrasound guidance. 3. Percutaneous right femoral artery access (CPT Q7814588) under fluoroscopic and ultrasound guidance (CPT 20590). 4. Introduction of catheter aorta, bilateral (CPT 13822-00). 5. Aortoiliac arteriogram (CPT 51446). 6. Percutaneous left femoral venous access under fluoroscopic guidance. 7. Insertion of temporary transvenous pacemaker (CPT 12127)     CASE SUMMARY:  1. Successful percutaneous right transfemoral TAVR using a 26 mm Live 3 Ultra THV + 1 mL extra volume  2. Transient heart block (for a few seconds just after initial valve deployment) with LBBB for a few minutes and subsequent return to native conduction for the remainder of the case  3. Trace to mild paravalvular leak  4. No vascular complications    CO-SURGEONS:   Dr. Lani Hidalgo. Grover Le:  None    ANESTHESIA:   MAC    CLINICAL HISTORY:   Marlys Griffin is a 81 y.o. male with severe, symptomatic aortic stenosis. He was evaluated and determined to be intermediate risk for conventional aortic valve replacement surgery. As such, he has been consented for FDA-approved commercial TAVR use, and is now taken to the operating room for endovascular transcatheter aortic valve replacement.      The risks of the procedure including death, stroke, vascular injury, the need for conversion to open surgery, transfusion and the need for permanent pacemaker were discussed with the patient and his family in detail. He signed informed consent. Surgical priority is elective. DESCRIPTION OF PROCEDURE:   After informed consent was obtained, the patient was brought to the operating room and positioned supine on the hybrid OR table. Prophylactic antibiotics were administered preoperatively. Invasive monitoring lines were placed by the cardiothoracic anesthesia team. The patient was prepped and draped from the chin to mid-thighs. Surgical time-out was performed. Under ultrasound guidance, the right common femoral artery is accessed percutaneously and a #6-Croatian sheath is placed. A J-tipped wire is placed in the descending thoracic aorta. Two separate Perclose devices are deployed for pre-closure. Over an Amplatz super stiff wire with a hand-fashioned J-tip the #6-Croatian sheath is exchanged for a #14-Croatian E-sheath. The sheath is flushed and then sewn into place. The left common femoral artery is accessed via micropuncture technique with radiographic and ultrasound guidance and a #6-Croatian sheath is placed there. The left femoral vein is accessed percutaneously, and a #6-Croatian venous sheath is placed under fluoroscopic guidance there. The patient is systemically heparinized with 100 units per kg of IV heparin to a goal ACT greater than 250. A transvenous pacing lead is advanced from the left femoral venous sheath up to the right ventricle. The pacemaker is tested and has good pacing capture at <1 mA. A pigtail catheter is advanced via the left femoral artery sheath over a J-tipped wire and positioned in the right-coronary sinus of Valsalva. A thoracic aortogram is obtained to determine the coplanar angle for valve implantation. Calcification of the aortic valve leaflets is evident. The aortic valve is then crossed using an AL-1 catheter and an 0.035\" straight tip guidewire.     Simultaneous LV and ascending aortic pressures are recorded. A Confida 0.035\" guidewire is positioned in the LV apex. Care is taken to avoid contact with the ventricular wall by the transition point of the wire to avoid LV perforation. A balloon aortic valvuloplasty is not performed. Next, a 26 mm Live 3 Ultra device is advanced via the #14-Bulgarian E-sheath and positioned in the descending aorta. The valve is mounted onto the balloon. The camera is then placed Occ[place] and the valve is carefully advanced across the aortic arch while applying flexion to the delivery system to prevent trauma to the aortic arch. The camera is then placed back into the coplanar deployment angle and the valve is positioned across the aortic valve. A thoracic aortogram is obtained to correctly position the valve across the native aortic valve. Under rapid ventricular pacing at 180 beats per minute, the valve is carefully and deliberately deployed with nominal volume and seats in a good position at a depth of 85 aortic / 15 ventricular on the non coronary side and 95 aortic / 5 ventricular on the left coronary side. Transthoracic echocardiography shows excellent valve position with no significant paravalvular or valvular aortic insufficiency. After deployment, transvalvular gradient is measured and is minimal. The left ventricular end-diastolic pressure is similar from predeployment, with a well preserved end-diastolic pressure gradient comparing the central aortic diastolic pressure simultaneously with the LVEDP, suggesting physiologically insignificant aortic insufficiency. Thoracic aortography is performed and shows mild paravalvular leak. The decision is made to post-dilate the valve. The valve delivery balloon is re-prepped with 1 mL of extra volume added. The balloon is advanced across the deployed THV. Under rapid ventricular pacing at 180 bpm, the balloon is inflated with good expansion/foreshortening of the THV.      Subsequent thoracic aortography is performed and shows mild paravalvular leak. This is not evident by transthoracic echocardiography. Satisfied with the valve position and function, we turn our attention to the access sites. The #14-Somali E-sheath on the right is removed and the two Perclose devices deployed. Next, the pigtail catheter is pulled down into the abdominal aorta and an iliofemoral pelvic arteriogram is taken. This demonstrates good flow bilaterally with non-flow limiting stenosis at the [facility] access site. There is an excellent pulse in the artery after repair completion and palpable pulses in the right foot, unchanged compared with prior to TAVR. The #6-Somali left femoral arterial sheath is removed and successful hemostasis is obtained with a #6-Somali AngioSeal.    The #6-Somali left femoral venous sheath is removed and successful hemostasis is obtained with manual compression in the hybrid OR per standard protocol. Protamine is administered and hemostasis is obtained. The intraoperative ECG monitoring at the end of the case shows NSR with a narrow QRS. No high degree AV block is present. LV Pressure Pre-TAVR:    195/11/29 mmHg  LV Pressure Post-TAVR:  103/9/12 mmHg    Estimated blood loss: < 50 cc     Complications: None    Disposition: PACU --> [facility]    All sponge, needle, and instrument counts are reported correct at the end of the case. The patient is taken to the PACU in stable condition at the end of procedure. Dr. Kirsten Verduzco and GRAY jointly performed the procedure and all of the critical components. The immediate results of the valve surgery were discussed with the patient's family. I, as co-surgeon, was scrubbed and present for the entire procedure. Implants:   Implant Name Type Inv. Item Serial No.  Lot No. LRB No. Used Action   VALVE HRT TRANSCATH 26MM YODIT 3 ULTRA - [id number]  VALVE HRT TRANSCATH 26MM YODIT 3 ULTRA [id number] [employer] N/A  1 Implanted     Luis Bazzimichael. Narciso Hernández, [address] [facility]  02/08/23    Electronically Signed by Abdiel Marquez MD on 2/8/2023 at 10:04 AM

## 2023-02-08 NOTE — Clinical Note
Temporary pacemaker inserted and tested. Inserted through the left groin. Temporary Pacer pacing in the right ventricle. Rate = 90 bpm.   10 mA. Threshold = 1 mA. Electrical capture obtained.

## 2023-02-08 NOTE — PERIOP NOTES
TRANSFER - OUT REPORT:    Verbal report given to File[address] RN (name) on Bob Zhang  being transferred to CVSU (room 464)(unit) for routine post - op       Report consisted of patients Situation, Background, Assessment and   Recommendations(SBAR). Time Pre op antibiotic QFWTR:9044  Anesthesia Stop time: 5550    Information from the following report(s) SBAR, Procedure Summary, Intake/Output, MAR, and Recent Results was reviewed with the receiving nurse. Opportunity for questions and clarification was provided. Is the patient on 02? NO       Is the patient on a monitor? YES    Is the nurse transporting with the patient? YES    Surgical Waiting Area notified of patient's transfer from PACU? YES      The following personal items collected during your admission accompanied patient upon transfer:   Dental Appliance: Dental Appliances: None  Vision:    Hearing Aid:    Jewelry:    Clothing: Clothing: At bedside (clothes,shoes,glasses returned to pt in pacu (glasses in pt shoe))  Other Valuables:  Other Valuables: Eyeglasses (glasses in belongings bag in his shoe)  Valuables sent to safe:

## 2023-02-08 NOTE — INTERVAL H&P NOTE
Date of Surgery Update:  Allyn Cabrera was seen and examined. History and physical has been reviewed. The patient has been examined.  There have been no significant clinical changes since the completion of the originally dated History and Physical.    Signed By: Zaynab Begum NP     February 8, 2023 8:04 AM

## 2023-02-08 NOTE — PROGRESS NOTES
Transition of Care    RUR: 11%  Disposition: Home with family  Barriers: None  Transportation:  Family to transport    Reason for Admission:  Shortness of Breath, Dizziness                   RUR Score: 11%                     Plan for utilizing home health:    Mr. Casey Umaña is willing to utilize home health services if needed. PCP: First and Last name:  Stanton Kohli MD     Name of Practice: [facility]   Are you a current patient: Yes/No: Yes   Approximate date of last visit: 8/1/2022   Can you participate in a virtual visit with your PCP: No                    Current Advanced Directive/Advance Care Plan: Full Code  Mr. Casey Umaña does have an advance care plan. He was asked to provide a copy for the medical record. Healthcare Decision Maker:   Click here to complete [place] including selection of the Healthcare Decision Maker Relationship (ie \"Primary\")  Mr. Alexis healthcare decision maker is his spouse,   Artis Anna ([phone])                  Transition of Care Plan:    Mr. Casey Umaña was seen in his room. His address and phone number were verified. He lives with his wife in a 6th floor apartment with elevator access. He does not have durable medical equipment. He is retired and does drive. He was independent with his ADLs and IADLs prior to his admission. His pharmacies are [facility] and [facility]. He is able to afford and acquire his medications. His wife and/or sister-in-law will provide transportation at discharge. Care Management Interventions  PCP Verified by CM:  Yes  Last Visit to PCP: 08/01/22  Palliative Care Criteria Met (RRAT>21 & CHF Dx)?: No  Mode of Transport at Discharge: Self  Transition of Care Consult (CM Consult): Discharge Planning  MyChart Signup: No  Discharge Durable Medical Equipment: No  Physical Therapy Consult: No  Occupational Therapy Consult: No  Speech Therapy Consult: No  Support Systems: Spouse/Significant Other, Other Family Member(s)  Confirm Follow Up Transport: Self  The Patient and/or Patient Representative was Provided with a Choice of Provider and Agrees with the Discharge Plan?: No  Freedom of Choice List was Provided with Basic Dialogue that Supports the Patient's Individualized Plan of Care/Goals, Treatment Preferences and Shares the Quality Data Associated with the Providers?: No   Resource Information Provided?: No  Discharge Location  Patient Expects to be Discharged to[de-identified] Home    Will continue to follow for discharge planning.   Signed By: Dami Friedman LCSW     February 8, 2023

## 2023-02-08 NOTE — PROGRESS NOTES
Neurocritical Care Code Stroke Documentation      Symptoms:  Right sided weakness   Baseline mRS:      Last Known Well:   0730 pre op per pt this was the last time he felt normal   Medical hx: Past Medical History:   Diagnosis Date    Arthritis     BPH (benign prostatic hyperplasia)     Esophageal cancer (HCC)     GERD (gastroesophageal reflux disease)     Mixed hyperlipidemia     Nonrheumatic aortic valve stenosis     Primary hypertension     Psoriasis       Anticoagulation:   None   VAN:   Negative   NIHSS:   1a-LOC:0    1b-Month/Age:0    1c-Open/Close Hand:0    2-Best Gaze:0    3-Visual Fields:0    4-Facial Palsy:1    5a-Left Arm:0    5b-Right Arm:1    6a-Left Le    6b-Right Le    7-Limb Ataxia:0    8-Sensory:0    9-Best Language:0    10-Dysarthria:0    11-Extinction/Inattention:0  TOTAL SCORE:3   Imaging:   CT head no acute process    CTA no lvo   Plan:   TNK Candidate: NO    Mechanical thrombectomy Candidate: NO     *Perform dysphagia screening prior to any PO intake*    Discussed with: Dr Verna Solis, and Jennifer Durham, recommend MRI brain, fluids, and permissive HTN     Arrival time: 1655  Time spent: 30 minutes.      Richardson Bright NP  Neurocritical Care Nurse Practitioner  [phone]

## 2023-02-08 NOTE — ANESTHESIA POSTPROCEDURE EVALUATION
Procedure(s):  TRANSCATHETER AORTIC VALVE REPLACEMENT RIGHT TRANSFEMORAL 26 S 3  TRANSCATHETER AORTIC VALVE REPLACEMENT (CATH). MAC    Anesthesia Post Evaluation        Patient location during evaluation: PACU  Note status: Adequate. Level of consciousness: responsive to verbal stimuli and sleepy but conscious  Pain management: satisfactory to patient  Airway patency: patent  Anesthetic complications: no  Cardiovascular status: acceptable  Respiratory status: acceptable  Hydration status: acceptable  Comments: +Post-Anesthesia Evaluation and Assessment    Patient: Ethel De La Garza MRN: [id number]  SSN: [id number]   YOB: 1940  Age: 81 y.o. Sex: male          Cardiovascular Function/Vital Signs    /64 (BP 1 Location: Left upper arm)   Pulse 78   Temp 36.4 °C (97.5 °F)   Resp 20   Ht 5' 10\" (1.778 m)   Wt 54.4 kg (120 lb)   SpO2 100%   BMI 17.22 kg/m²     Patient is status post Procedure(s):  TRANSCATHETER AORTIC VALVE REPLACEMENT RIGHT TRANSFEMORAL 26 S 3  TRANSCATHETER AORTIC VALVE REPLACEMENT (CATH). Nausea/Vomiting: Controlled. Postoperative hydration reviewed and adequate. Pain:  Pain Scale 1: Numeric (0 - 10) (02/08/23 1013)  Pain Intensity 1: 0 (02/08/23 1013)   Managed. Neurological Status:   Neuro (WDL): Within Defined Limits (02/08/23 1013)   At baseline. Mental Status and Level of Consciousness: Arousable. Pulmonary Status:   O2 Device: None (Room air) (02/08/23 1013)   Adequate oxygenation and airway patent. Complications related to anesthesia: None    Post-anesthesia assessment completed. No concerns. I have evaluated the patient and the patient is stable and ready to be discharged from PACU .     Signed By: Delmy Pineda MD    2/8/2023        INITIAL Post-op Vital signs:   Vitals Value Taken Time   /71 02/08/23 1045   Temp 36.4 °C (97.5 °F) 02/08/23 1013   Pulse 73 02/08/23 1046   Resp 0 02/08/23 1045   SpO2 98 % 02/08/23 1046   Vitals shown include unvalidated device data.

## 2023-02-08 NOTE — PROGRESS NOTES
1647: Code stroke called. 1650: BG 87    1651:Melissa Trinh, NP at bedside. 1652: Code stroke team at bedside. 1654: CT ordered. 1658: Patient down to CT with primary nurse Yunior Guerra, RRT nurse, and nursing supervisor.

## 2023-02-08 NOTE — PROGRESS NOTES
1345 TRANSFER - IN REPORT:    Verbal report received from Mayra Corral RN(name) on Milka Qualia  being received from [facility]) for routine post - op      Report consisted of patients Situation, Background, Assessment and   Recommendations(SBAR). Information from the following report(s) SBAR, Kardex, ED Summary, Procedure Summary, Intake/Output, MAR, and Recent Results was reviewed with the receiving nurse. Opportunity for questions and clarification was provided. Assessment completed upon patients arrival to unit and care assumed. [address] [facility] to [facility] in cardiothoracic as patient reports he feels foggy. Advise residual of procedure from today but will continue to monitor     1640 Neurological changes noted. Charge nurse notified. 1645 Code stroke called    2000 Bedside shift change report given to Janae Costa (oncoming nurse) by Cassidy Mullen (offgoing nurse). Report included the following information SBAR, Kardex, Procedure Summary, Intake/Output, MAR, and Recent Results.

## 2023-02-08 NOTE — PROGRESS NOTES
CARDIOLOGY NOTE    Code stroke called for left facial droop and right leg weakness. CTH shows no acute abnormality and no perfusion abnormality. CTA shows no LVO. I spent >30 minutes talking with him and re-evaluating him and he had possible subtle weakness in the RUE but has 5/5 plantar and dorsiflexion of both feet. He also is able to smile appropriately with no abnormal drooping of his left face. I also notice that his picture in the EMR has left facial droop. He denies prior history of stroke. We discussed plans for an MRI brain and also discussed that his difficulty sitting up in bed and feeling that his words are coming out more slowly than usual could be from 24 hours of bedrest, without food and from sedation for the TAVR. Will re-assess tomorrow and await the results of the MRI brain. Thank you for the opportunity to participate in the care of Chad Gonzalez and please do not hesitate to contact us should you have any questions. Erick Gonzales, [address] [facility]  02/08/23

## 2023-02-08 NOTE — ANESTHESIA PREPROCEDURE EVALUATION
Relevant Problems   No relevant active problems       Anesthetic History   No history of anesthetic complications            Review of Systems / Medical History  Patient summary reviewed, nursing notes reviewed and pertinent labs reviewed    Pulmonary  Within defined limits                 Neuro/Psych   Within defined limits           Cardiovascular    Hypertension  Valvular problems/murmurs: aortic stenosis        Hyperlipidemia         GI/Hepatic/Renal     GERD           Endo/Other        Arthritis     Other Findings              Physical Exam    Airway  Mallampati: II  TM Distance: 4 - 6 cm  Neck ROM: normal range of motion   Mouth opening: Normal     Cardiovascular    Rhythm: regular  Rate: normal    Murmur, Aortic area     Dental  No notable dental hx       Pulmonary  Breath sounds clear to auscultation               Abdominal  GI exam deferred       Other Findings            Anesthetic Plan    ASA: 4  Anesthesia type: MAC    Monitoring Plan: Arterial line      Induction: Intravenous  Anesthetic plan and risks discussed with: Patient

## 2023-02-09 ENCOUNTER — APPOINTMENT (OUTPATIENT)
Dept: MRI IMAGING | Age: 83
End: 2023-02-09
Attending: NURSE PRACTITIONER
Payer: MEDICARE

## 2023-02-09 ENCOUNTER — APPOINTMENT (OUTPATIENT)
Dept: NON INVASIVE DIAGNOSTICS | Age: 83
End: 2023-02-09
Attending: NURSE PRACTITIONER
Payer: MEDICARE

## 2023-02-09 PROBLEM — E43 SEVERE PROTEIN-CALORIE MALNUTRITION (HCC): Status: ACTIVE | Noted: 2023-02-09

## 2023-02-09 PROBLEM — E43 SEVERE PROTEIN-CALORIE MALNUTRITION (HCC): Chronic | Status: ACTIVE | Noted: 2023-02-09

## 2023-02-09 LAB
ANION GAP SERPL CALC-SCNC: 9 MMOL/L (ref 5–15)
BUN SERPL-MCNC: 20 MG/DL (ref 6–20)
BUN/CREAT SERPL: 15 (ref 12–20)
CALCIUM SERPL-MCNC: 8.9 MG/DL (ref 8.5–10.1)
CHLORIDE SERPL-SCNC: 104 MMOL/L (ref 97–108)
CO2 SERPL-SCNC: 26 MMOL/L (ref 21–32)
CREAT SERPL-MCNC: 1.33 MG/DL (ref 0.7–1.3)
ECHO AV AREA PEAK VELOCITY: 1.7 CM2
ECHO AV AREA PEAK VELOCITY: 1.9 CM2
ECHO AV AREA VTI: 2.7 CM2
ECHO AV AREA/BSA VTI: 1.6 CM2/M2
ECHO AV MEAN GRADIENT: 8 MMHG
ECHO AV MEAN VELOCITY: 1.3 M/S
ECHO AV PEAK GRADIENT: 12 MMHG
ECHO AV PEAK VELOCITY: 1.7 M/S
ECHO AV VTI: 32.1 CM
ECHO EST RA PRESSURE: 3 MMHG
ECHO LA VOL 4C: 47 ML (ref 18–58)
ECHO LA VOLUME INDEX A4C: 28 ML/M2 (ref 16–34)
ECHO LV FRACTIONAL SHORTENING: 25 % (ref 28–44)
ECHO LV INTERNAL DIMENSION DIASTOLE INDEX: 2.14 CM/M2
ECHO LV INTERNAL DIMENSION DIASTOLIC: 3.6 CM (ref 4.2–5.9)
ECHO LV INTERNAL DIMENSION SYSTOLIC INDEX: 1.61 CM/M2
ECHO LV INTERNAL DIMENSION SYSTOLIC: 2.7 CM
ECHO LV IVSD: 1 CM (ref 0.6–1)
ECHO LV MASS 2D: 115.9 G (ref 88–224)
ECHO LV MASS INDEX 2D: 69 G/M2 (ref 49–115)
ECHO LV POSTERIOR WALL DIASTOLIC: 1.1 CM (ref 0.6–1)
ECHO LV RELATIVE WALL THICKNESS RATIO: 0.61
ECHO LVOT AREA: 3.8 CM2
ECHO LVOT AV VTI INDEX: 0.71
ECHO LVOT DIAM: 2.2 CM
ECHO LVOT MEAN GRADIENT: 3 MMHG
ECHO LVOT PEAK GRADIENT: 4 MMHG
ECHO LVOT PEAK GRADIENT: 5 MMHG
ECHO LVOT PEAK VELOCITY: 1 M/S
ECHO LVOT PEAK VELOCITY: 1.1 M/S
ECHO LVOT STROKE VOLUME INDEX: 51.3 ML/M2
ECHO LVOT SV: 86.2 ML
ECHO LVOT VTI: 22.7 CM
ECHO MV A VELOCITY: 1.33 M/S
ECHO MV AREA PHT: 2.4 CM2
ECHO MV AREA VTI: 2.2 CM2
ECHO MV E VELOCITY: 1.04 M/S
ECHO MV E/A RATIO: 0.78
ECHO MV LVOT VTI INDEX: 1.74
ECHO MV MAX VELOCITY: 1.4 M/S
ECHO MV MEAN GRADIENT: 4 MMHG
ECHO MV MEAN VELOCITY: 0.9 M/S
ECHO MV PEAK GRADIENT: 8 MMHG
ECHO MV PRESSURE HALF TIME (PHT): 91.8 MS
ECHO MV REGURGITANT PEAK GRADIENT: 77 MMHG
ECHO MV REGURGITANT PEAK VELOCITY: 4.4 M/S
ECHO MV VTI: 39.6 CM
ECHO RIGHT VENTRICULAR SYSTOLIC PRESSURE (RVSP): 35 MMHG
ECHO RV INTERNAL DIMENSION: 3.7 CM
ECHO RV TAPSE: 2.8 CM (ref 1.7–?)
ECHO TV REGURGITANT MAX VELOCITY: 2.82 M/S
ECHO TV REGURGITANT PEAK GRADIENT: 32 MMHG
ERYTHROCYTE [DISTWIDTH] IN BLOOD BY AUTOMATED COUNT: 14.5 % (ref 11.5–14.5)
GLUCOSE SERPL-MCNC: 95 MG/DL (ref 65–100)
HCT VFR BLD AUTO: 37 % (ref 36.6–50.3)
HGB BLD-MCNC: 11.8 G/DL (ref 12.1–17)
MCH RBC QN AUTO: 33.2 PG (ref 26–34)
MCHC RBC AUTO-ENTMCNC: 31.9 G/DL (ref 30–36.5)
MCV RBC AUTO: 104.2 FL (ref 80–99)
NRBC # BLD: 0 K/UL (ref 0–0.01)
NRBC BLD-RTO: 0 PER 100 WBC
PLATELET # BLD AUTO: 129 K/UL (ref 150–400)
PMV BLD AUTO: 10.9 FL (ref 8.9–12.9)
POTASSIUM SERPL-SCNC: 4.5 MMOL/L (ref 3.5–5.1)
RBC # BLD AUTO: 3.55 M/UL (ref 4.1–5.7)
SODIUM SERPL-SCNC: 139 MMOL/L (ref 136–145)
WBC # BLD AUTO: 9.1 K/UL (ref 4.1–11.1)

## 2023-02-09 PROCEDURE — APPSS45 APP SPLIT SHARED TIME 31-45 MINUTES: Performed by: NURSE PRACTITIONER

## 2023-02-09 PROCEDURE — 93005 ELECTROCARDIOGRAM TRACING: CPT

## 2023-02-09 PROCEDURE — 97535 SELF CARE MNGMENT TRAINING: CPT

## 2023-02-09 PROCEDURE — 74011250636 HC RX REV CODE- 250/636: Performed by: NURSE PRACTITIONER

## 2023-02-09 PROCEDURE — 70551 MRI BRAIN STEM W/O DYE: CPT

## 2023-02-09 PROCEDURE — 93306 TTE W/DOPPLER COMPLETE: CPT

## 2023-02-09 PROCEDURE — 74011250637 HC RX REV CODE- 250/637: Performed by: NURSE PRACTITIONER

## 2023-02-09 PROCEDURE — 65660000001 HC RM ICU INTERMED STEPDOWN

## 2023-02-09 PROCEDURE — 36415 COLL VENOUS BLD VENIPUNCTURE: CPT

## 2023-02-09 PROCEDURE — 97165 OT EVAL LOW COMPLEX 30 MIN: CPT

## 2023-02-09 PROCEDURE — 97530 THERAPEUTIC ACTIVITIES: CPT

## 2023-02-09 PROCEDURE — 80048 BASIC METABOLIC PNL TOTAL CA: CPT

## 2023-02-09 PROCEDURE — 97162 PT EVAL MOD COMPLEX 30 MIN: CPT

## 2023-02-09 PROCEDURE — 94760 N-INVAS EAR/PLS OXIMETRY 1: CPT

## 2023-02-09 PROCEDURE — 92610 EVALUATE SWALLOWING FUNCTION: CPT | Performed by: SPEECH-LANGUAGE PATHOLOGIST

## 2023-02-09 PROCEDURE — 74011000250 HC RX REV CODE- 250: Performed by: NURSE PRACTITIONER

## 2023-02-09 PROCEDURE — 85027 COMPLETE CBC AUTOMATED: CPT

## 2023-02-09 RX ADMIN — CEFAZOLIN 2 G: 1 INJECTION, POWDER, FOR SOLUTION INTRAMUSCULAR; INTRAVENOUS at 06:34

## 2023-02-09 RX ADMIN — TROSPIUM CHLORIDE 20 MG: 20 TABLET, FILM COATED ORAL at 08:26

## 2023-02-09 RX ADMIN — SODIUM CHLORIDE, PRESERVATIVE FREE 10 ML: 5 INJECTION INTRAVENOUS at 13:34

## 2023-02-09 RX ADMIN — ASPIRIN 81 MG: 81 TABLET, CHEWABLE ORAL at 08:26

## 2023-02-09 RX ADMIN — SODIUM CHLORIDE, PRESERVATIVE FREE 10 ML: 5 INJECTION INTRAVENOUS at 21:03

## 2023-02-09 RX ADMIN — SENNOSIDES AND DOCUSATE SODIUM 1 TABLET: 50; 8.6 TABLET ORAL at 08:26

## 2023-02-09 RX ADMIN — SODIUM CHLORIDE, PRESERVATIVE FREE 10 ML: 5 INJECTION INTRAVENOUS at 06:43

## 2023-02-09 RX ADMIN — SENNOSIDES AND DOCUSATE SODIUM 1 TABLET: 50; 8.6 TABLET ORAL at 17:54

## 2023-02-09 RX ADMIN — TAMSULOSIN HYDROCHLORIDE 0.4 MG: 0.4 CAPSULE ORAL at 08:26

## 2023-02-09 RX ADMIN — SODIUM CHLORIDE, PRESERVATIVE FREE 10 ML: 5 INJECTION INTRAVENOUS at 21:04

## 2023-02-09 NOTE — PROGRESS NOTES
Comprehensive Nutrition Assessment    Type and Reason for Visit: Initial (Low BMI)    Nutrition Recommendations/Plan:   Continue with No Added Salt diet  Will order Ensure Clear and Magic Cup ONS daily       Malnutrition Assessment:  Malnutrition Status:  Severe malnutrition (02/09/23 1155)    Context:  Chronic illness     Findings of the 6 clinical characteristics of malnutrition:   Energy Intake:  No significant decrease in energy intake  Weight Loss:  No significant weight loss     Body Fat Loss:  Severe body fat loss, Triceps, Fat overlying ribs, Buccal region, Orbital   Muscle Mass Loss:  Severe muscle mass loss, Temples (temporalis), Clavicles (pectoralis &deltoids)  Fluid Accumulation:  No significant fluid accumulation,     Strength:  Not performed        Nutrition Assessment:    80 yo male admitted for aortic stenosis. Pmhx: HTN, HLD, esophageal CA s/p esophagectomy in 2004. S/P TAVR on 2/8, later that day a code stroke was called. 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 Seeing pt for low BMI of 17.  Spoke with pt at bedside. He states he has received 2 meals so far since admission and has not eating much of anything because he has not been hungry; only ate a few bites of roast beef sandwich. Reports good appetite at home PTA, stating he eats 3 meals/day. Discussed ONS options given his poor PO intake and low weight. He reports having to drink a lot of Ensure shakes when he had esophageal CA and he no longer likes them. Agreed to try Ensure Clear (apple) and Magic Cup (raman/orange). Encouraged increased PO intakes. Pt denies any difficulty swallowing from esophagectomy. Weight hx in EMR indicates stable weight over last 7 months. Pt confirms that his weight has been stable, stating it usually stays between 115-120 lbs. Current charted weight is 119 lbs. Severe muscle and fat wasting present. Pt denies feeling weak until he got to the hospital.    Labs reviewed. Nutritionally Significant Medications:  Pericolace      Estimated Daily Nutrient Needs:  Energy Requirements Based On: Kcal/kg  Weight Used for Energy Requirements: Current  Energy (kcal/day): 1900 (35 kcals/kg)  Weight Used for Protein Requirements: Current  Protein (g/day): 97 (1.8 gm/kg (20%))  Method Used for Fluid Requirements: 1 ml/kcal  Fluid (ml/day): 1900    Nutrition Related Findings:   Edema: none  Last BM:  ,      Wounds: Surgical incision      Current Nutrition Therapies:  Diet: No Salt Added  Supplements: none  Meal intake: Patient Vitals for the past 168 hrs:   % Diet Eaten   02/08/23 1603 0%     Supplement intake: No data found. Nutrition Support: none      Anthropometric Measures:  Height: 5' 10\" (177.8 cm)  Ideal Body Weight (IBW): 166 lbs (75 kg)     Current Body Wt:  54.4 kg (119 lb 14.9 oz), 72.2 % IBW.  Standing scale  Current BMI (kg/m2): 17.2        Weight Adjustment: No adjustment                 BMI Category: Underweight (BMI less than 22) age over 72    Wt Readings from Last 10 Encounters:   02/09/23 54.4 kg (119 lb 14.9 oz)   02/01/23 54.4 kg (120 lb)   01/05/23 54.7 kg (120 lb 8 oz)   08/18/22 54.4 kg (120 lb)   08/01/22 54.4 kg (120 lb)   07/25/22 54.4 kg (120 lb)           Nutrition Diagnosis:   Inadequate oral intake related to inadequate protein-energy intake as evidenced by intake 0-25%  Severe malnutrition related to inadequate protein-energy intake as evidenced by severe muscle loss, severe loss of subcutaneous fat    Nutrition Interventions:   Food and/or Nutrient Delivery: Continue current diet, Start oral nutrition supplement  Nutrition Education/Counseling: No recommendations at this time  Coordination of Nutrition Care: Continue to monitor while inpatient       Goals:     Goals: other (specify)  Specify Other Goals: PO intakes > 75% meals + ONS over nexxt 5-7 days    Nutrition Monitoring and Evaluation:   Behavioral-Environmental Outcomes: None identified  Food/Nutrient Intake Outcomes: Food and nutrient intake, Supplement intake  Physical Signs/Symptoms Outcomes: Biochemical data, GI status, Weight    Discharge Planning:    Continue current diet, Continue oral nutrition supplement    Derek Trejo RD  Available via [employer]

## 2023-02-09 NOTE — PROGRESS NOTES
Problem: Self Care Deficits Care Plan (Adult)  Goal: *Acute Goals and Plan of Care (Insert Text)  Description: FUNCTIONAL STATUS PRIOR TO ADMISSION: Patient was independent and active without use of DME. Patient was independent for basic and instrumental ADLs. Admitted for TAVR with neuro changes postop with MRI revealing multiple infarcts. Was extremely independent PTA and taking care of wife at [address] who has advanced dementia. HOME SUPPORT: The patient lived with wife but did not require assist, patient was wife's caregiver    Occupational Therapy Goals  Initiated 2/9/2023   1. Patient will perform grooming with minimal assistance/contact guard assist in standing within 7 day(s). 2.  Patient will perform lower body dressing with moderate assistance  within 7 day(s). 3.  Patient will perform upper body dressing with minimal assistance/contact guard assist within 7 day(s). 4.  Patient will perform toilet transfers with maximal assistance within 7 day(s). 5.  Patient will perform all aspects of toileting with maximal assistance within 7 day(s). 6.  Patient will participate in upper extremity therapeutic exercise/activities with supervision/set-up within 7 day(s). 7.  Patient will utilize energy conservation techniques during functional activities with verbal and visual cues within 7 day(s). 8.  Patient will improve their Fugl Shrestha score by 5 points in prep for ADLs within 7 days.       Outcome: Progressing Towards Goal   OCCUPATIONAL THERAPY EVALUATION  Patient: Wale Irvin (36 y.o. male)  Date: 2/9/2023  Primary Diagnosis: Aortic stenosis, severe [I35.0]  Procedure(s) (LRB):  TRANSCATHETER AORTIC VALVE REPLACEMENT RIGHT TRANSFEMORAL 26 S 3 (Right)  TRANSCATHETER AORTIC VALVE REPLACEMENT (CATH) (N/A) 1 Day Post-Op   Precautions:  Fall    ASSESSMENT  Based on the objective data described below, the patient presents with impaired balance, strength, coordination, and activity tolerance impacting ability to complete ADL/IADL at baseline. Patient received reclined in bed, amenable to session. Patient's cognition intact, good insight into deficits. Able to complete bed mobility with x2 assist, required significant assist to maintain balance d/t fatigue during MMT at EOB. Completed transfer to standing with x2 assist, requiring extended time and effort to transfer to bedside chair. Seated, able to complete grooming with CGA encouraging patient to utilize R hand during all ADL. Benefits from min verbal cues to use R hand during face washing. Educated on s/s of CVA, patient verbalized understanding. Anticipate patient would benefit from IPR upon discharge to maximize ADL independence and safety prior to returning home. Current Level of Function Impacting Discharge (ADLs/self-care): up to x2 assist OOB mobility, CGA supported seated grooming    Functional Outcome Measure: The patient scored Total A-D  Total A-D (Motor Function): 54/66 on the Fugl-Shrestha Assessment which is indicative of moderate impairment in upper extremity functional status. Other factors to consider for discharge: below baseline, was highly independent prior to admission (able to go up 6 flights of stairs), caregiver to wife     Patient will benefit from skilled therapy intervention to address the above noted impairments. PLAN :  Recommendations and Planned Interventions: self care training, functional mobility training, therapeutic exercise, balance training, therapeutic activities, endurance activities, patient education, home safety training, and family training/education    Frequency/Duration: Patient will be followed by occupational therapy 5 times a week to address goals.     Recommendation for discharge: (in order for the patient to meet his/her long term goals)  Therapy 3 hours per day 5-7 days per week    This discharge recommendation:  Has been made in collaboration with the attending provider and/or case management    IF patient discharges home will need the following DME: TBD pending progress       SUBJECTIVE:   Patient stated I'm very worried about my wife.     OBJECTIVE DATA SUMMARY:   HISTORY:   Past Medical History:   Diagnosis Date    Arthritis     BPH (benign prostatic hyperplasia)     Esophageal cancer (HCC)     GERD (gastroesophageal reflux disease)     Mixed hyperlipidemia     Nonrheumatic aortic valve stenosis     Primary hypertension     Psoriasis      Past Surgical History:   Procedure Laterality Date    HX CATARACT REMOVAL Bilateral     HX COLONOSCOPY      HX ENDOSCOPY      HX ESOPHAGECTOMY  2004    HX HERNIA REPAIR       Expanded or extensive additional review of patient history:     Home Situation  Home Environment: Other (comment) (Lives in [address] [place] [address] apartment at [address])  One/Two Story Residence: One story  Living Alone: No  Support Systems: Spouse/Significant Other  Patient Expects to be Discharged to[de-identified] Home with home health  Current DME Used/Available at Home: Grab bars  Tub or Shower Type: Shower    Hand dominance: Right    EXAMINATION OF PERFORMANCE DEFICITS:  Cognitive/Behavioral Status:  Neurologic State: Alert  Orientation Level: Oriented X4  Cognition: Follows commands  Perception: Appears intact  Perseveration: No perseveration noted  Safety/Judgement: Awareness of environment; Fall prevention; Insight into deficits    Skin: intact where visible, Surgical dressing C/D/I    Edema: none noted    Hearing: Auditory  Auditory Impairment: Hard of hearing, bilateral    Vision/Perceptual:    Tracking: Able to track stimulus in all quadrants w/o difficulty    Saccades: Additional eye shifts occurred during testing            Diplopia: No    Acuity: Able to read clock/calendar on wall without difficulty; Able to read employee name badge without difficulty (wears reading glasses at baseline)    Corrective Lenses: Reading glasses    Range of Motion:  AROM: Grossly decreased, non-functional (RLE grossly decreased)  PROM: Within functional limits                      Strength:  Strength: Grossly decreased, non-functional (RLE grossly decreased 1 to 2-/5)                Coordination:  Coordination: Grossly decreased, non-functional (RLE grossly decreased)  Fine Motor Skills-Upper: Left Intact; Right Impaired    Gross Motor Skills-Upper: Left Intact; Right Impaired    Tone & Sensation:  Tone: Normal  Sensation: Intact                      Balance:  Sitting: Impaired; Without support  Sitting - Static: Fair (occasional)  Sitting - Dynamic: Poor (constant support); Prop sitting  Standing: Impaired; With support  Standing - Static: Constant support;Fair;Poor  Standing - Dynamic : Constant support;Poor    Functional Mobility and Transfers for ADLs:  Bed Mobility:  Rolling: Moderate assistance  Supine to Sit: Maximum assistance; Moderate assistance;Assist x2  Sit to Supine:  (in recliner)  Scooting: Moderate assistance;Maximum assistance    Transfers:  Sit to Stand: Moderate assistance;Maximum assistance;Assist x2  Stand to Sit: Moderate assistance;Assist x2  Bed to Chair: Moderate assistance;Assist x2    ADL Assessment:  Feeding: Contact guard assistance    Oral Facial Hygiene/Grooming: Contact guard assistance    Bathing: Moderate assistance    Type of Bath: Chlorhexidine (CHG); Basin/Soap/Water    Upper Body Dressing: Minimum assistance    Lower Body Dressing: Maximum assistance    Toileting: Maximum assistance                ADL Intervention and task modifications:       Grooming  Grooming Assistance: Contact guard assistance  Position Performed: Seated in chair  Washing Face: Contact guard assistance;Training to use affected extremity as a fine motor assistance;Training to use affected extremity as a gross motor assistance  Cues: Verbal cues provided         Type of Bath: Chlorhexidine (CHG); Basin/Soap/Water                        Cognitive Retraining  Safety/Judgement: Awareness of environment; Fall prevention; Insight into deficits     Functional Measure:  Fugl-Shrestha Assessment of Motor Recovery after Stroke:   Upper Extremity Assessment: Yes    Reflex Activity  Flexors/Biceps/Fingers: Can be elicited  Extensors/Triceps: Can be elicited  Reflex Subtotal: 4    Volitional Movement Within Synergies  Shoulder Retraction: Full  Shoulder Elevation: Full  Shoulder Abduction (90 degrees): Full  Shoulder External Rotation: Full  Elbow Flexion: Full  Forearm Supination: Full  Shoulder Adduction/Internal Rotation: Full  Elbow Extension: Full  Forearm Pronation: Full  Subtotal: 18    Volitional Movement Mixing Synergies  Hand to Lumbar Spine: Full  Shoulder Flexion (0-90 degrees): Full  Pronation-Supination: Full  Subtotal: 6    Volitional Movement With Little or No Synergy  Shoulder Abduction (0-90 degrees): Partial  Shoulder Flexion ( degrees): Partial  Pronation/Supination: Full  Subtotal : 4    Normal Reflex Activity  Biceps, Triceps, Finger Flexors: Full  Subtotal : 2    Upper Extremity Total   Upper Extremity Total: 34    Wrist  Stability at 15 Degree Dorsiflexion: Partial  Repeated Dorsiflexion/ Volar Flexion: Partial  Stability at 15 Degree Dorsiflexion: Partial  Repeated Dorsiflexion/ Volar Flexion: Partial  Circumduction: Full  Wrist Total: 6    Hand  Mass Flexion: Partial  Mass Extension: Full  Grasp A: Full  Grasp B: Partial  Grasp C: Full  Grasp D: Full  Grasp E: Full  Hand Total: 12    Coordination/Speed  Tremor: Marked  Dysmetria: Slight  Time: 2-5s  Coordination/Speed Total : 2    Total A-D  Total A-D (Motor Function): 54/66     This is a reliable/valid measure of arm function after a neurological event. It has established value to characterize functional status and for measuring spontaneous and therapy-induced recovery; tests proximal and distal motor functions.  Fugl-Shrestha Assessment - UE scores recorded between five and 30 days post neurologic event can be used to predict UE recovery at six months post neurologic event. Severe = 0-21 points   Moderately Severe = 22-33 points   Moderate = 34-47 points   Mild = 48-66 points  ANDREY Ulloa, LISA Ochoa, & ROMEO Truong (1992). Measurement of motor recovery after stroke: Outcome assessment and sample size requirements. Stroke, 23, pp. 0556-9425.   ------------------------------------------------------------------------------------------------------------------------------------------------------------------  MCID:  Stroke:   Eben Fitch et al, 2001; n = 171; mean age 79 (6) years; assessed within 16 (12) days of stroke, Acute Stroke)  FMA Motor Scores from Admission to Discharge   10 point increase in FMA Upper Extremity = 1.5 change in discharge FIM   10 point increase in FMA Lower Extremity = 1.9 change in discharge FIM  MDC:   Stroke:   Faiza Gutiérrez et al, 2008, n = 14, mean age = 59.9 (14.6) years, assessed on average 14 (6.5) months post stroke, Chronic Stroke)   FMA = 5.2 points for the Upper Extremity portion of the assessment     Occupational Therapy Evaluation Charge Determination   History Examination Decision-Making   LOW Complexity : Brief history review  MEDIUM Complexity : 3-5 performance deficits relating to physical, cognitive , or psychosocial skils that result in activity limitations and / or participation restrictions MEDIUM Complexity : Patient may present with comorbidities that affect occupational performnce.  Miniml to moderate modification of tasks or assistance (eg, physical or verbal ) with assesment(s) is necessary to enable patient to complete evaluation       Based on the above components, the patient evaluation is determined to be of the following complexity level: LOW   Pain Rating:  None reported    Activity Tolerance:   Fair and requires rest breaks    After treatment patient left in no apparent distress:    Sitting in chair, Call bell within reach, Bed / chair alarm activated, and RN aware    COMMUNICATION/EDUCATION:   The patients plan of care was discussed with: Physical therapist, Registered nurse, and Case management. Patient was educated regarding his deficit(s) of R sided weakness as this relates to his diagnosis of CVA. He demonstrated Good understanding as evidenced by understanding limitations with taking care of wife at home. Patient and/or family was verbally educated on the BE FAST acronym for signs/symptoms of CVA and TIA. BE FAST was written on patient's communication board  for visual education and reinforcement. All questions answered with patient indicating good understanding. Home safety education was provided and the patient/caregiver indicated understanding., Patient/family have participated as able in goal setting and plan of care. , and Patient/family agree to work toward stated goals and plan of care. This patients plan of care is appropriate for delegation to [facility].     Thank you for this referral.  Celestino Henderson OT  Time Calculation: 32 mins

## 2023-02-09 NOTE — PROGRESS NOTES
Problem: Mobility Impaired (Adult and Pediatric)  Goal: *Acute Goals and Plan of Care (Insert Text)  Description:   FUNCTIONAL STATUS PRIOR TO ADMISSION: Patient was independent and active without use of DME. Patient reports being able to ambulate 6 flights of stairs recently without being winded. He was very active. 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 Drives in the community. HOME SUPPORT PRIOR TO ADMISSION: The patient lived with his wife. His wife has dementia and he was her caregiver. Was not providing physical assist but provided supervision for all mobility and prepared all meals. Physical Therapy Goals  Initiated 2/9/2023  1. Patient will move from supine to sit and sit to supine , scoot up and down, and roll side to side in bed with supervision/set-up within 7 day(s). 2.  Patient will transfer from bed to chair and chair to bed with minimal assistance/contact guard assist using the least restrictive device within 7 day(s). 3.  Patient will perform sit to stand with minimal assistance/contact guard assist within 7 day(s). 4.  Patient will ambulate with minimal assistance/contact guard assist for 20 feet with the least restrictive device within 7 day(s). 5.  Patient will improve Gamble Balance score by 7 points within 7 days.      Outcome: Progressing Towards Goal     PHYSICAL THERAPY EVALUATION- NEURO POPULATION  Patient: Juan Rodriguez (21 y.o. male)  Date: 2/9/2023  Primary Diagnosis: Aortic stenosis, severe [I35.0]  Procedure(s) (LRB):  TRANSCATHETER AORTIC VALVE REPLACEMENT RIGHT TRANSFEMORAL 26 S 3 (Right)  TRANSCATHETER AORTIC VALVE REPLACEMENT (CATH) (N/A) 1 Day Post-Op   Precautions: fall         ASSESSMENT  Based on the objective data described below, the patient presents with R sided hemiparesis (LE worse than UE), L facial droop, decreased R sided coordination, slowed processing of commands, poor sitting and standing balance, increased need for assistance, poor activity tolerance. This is an 81year old male who is POD#1 with planned TAVR. Following procedure, patient found to have RLE weakness and L facial droop and MRI showed small acute infarcts in L frontal/occipital/parietal lobe. Patient is received in bed on RA and agreeable to treatment. At functional baseline he was completely independent, highly active including stair climbing up to 6 flights, drove in community, took on caregiving duties for his wife with dementia. Today he presents significantly below baseline. RLE hemiparesis noted, trace contraction to 2-/5 during MMT of R hip flexion, abduction, knee extension, ankle DF. Patient with poor coordination of RLE as well. Patient required overall mod to max A for bed mobility and transfers due to RLE deficits. Reaches recliner with SPT, bilateral HHA and one step verbal cues. Difficulty noted with movement/advancement of R limb. Patient unable to ambulate today. Patient demonstrates high risk for falls as evidenced by FULLER score of 2/56 (<45 indicates high risk for falls). Patient is highly motivated and has high PLOF, therefore patient would benefit from intensive IPR at discharge. He is a great candidate for 3 hours of therapy per day. Communicated findings to CM and RN. Will continue to follow in acute setting. Vitals:    02/09/23 1200 02/09/23 1400 02/09/23 1427 02/09/23 1445   BP:   (!) 154/64 (!) 148/68   BP 1 Location:   Left arm Left lower arm   BP Patient Position:   At rest;Semi fowlers Sitting; Other (Comment)  Comment: post transfer   Pulse: 90 83 (!) 109 (!) 116   Temp:       Resp:   18    Height:       Weight:       SpO2:   100%         Current Level of Function Impacting Discharge (mobility/balance): mod to max A for rolling, supine to sit mod to max A x 2, mod A for scooting, mod to max A for sit to stand, max A x 2 for stand pivot transfer    Functional Outcome Measure:  The patient scored Total: 2/56 on the [address] which is indicative of high fall risk. Other factors to consider for discharge: high PLOF, very concerned about the care for his wife, motivated to return home     Patient will benefit from skilled therapy intervention to address the above noted impairments. PLAN :  Recommendations and Planned Interventions: bed mobility training, transfer training, gait training, therapeutic exercises, neuromuscular re-education, patient and family training/education, and therapeutic activities      Frequency/Duration: Patient will be followed by physical therapy:  5 times a week to address goals. Recommendation for discharge: (in order for the patient to meet his/her long term goals)  Therapy 3 hours per day 5-7 days per week    This discharge recommendation:  Has been made in collaboration with the attending provider and/or case management    IF patient discharges home will need the following DME: to be determined (TBD)         SUBJECTIVE:   Patient stated That is going to be a problem.  When discussing need for increased assistance physically, not safe to return home at this time.     OBJECTIVE DATA SUMMARY:   HISTORY:    Past Medical History:   Diagnosis Date    Arthritis     BPH (benign prostatic hyperplasia)     Esophageal cancer (HCC)     GERD (gastroesophageal reflux disease)     Mixed hyperlipidemia     Nonrheumatic aortic valve stenosis     Primary hypertension     Psoriasis      Past Surgical History:   Procedure Laterality Date    HX CATARACT REMOVAL Bilateral     HX COLONOSCOPY      HX ENDOSCOPY      HX ESOPHAGECTOMY  2004    HX HERNIA REPAIR         Personal factors and/or comorbidities impacting plan of care: very high PLOF, primary caregiver for his wife with dementia    Home Situation  Home Environment: Other (comment) (Lives in [address] [place] [address] apartment at [address])  One/Two Story Residence: One story  Living Alone: No  Support Systems: Spouse/Significant Other  Patient Expects to be Discharged to[de-identified] Home with home health  Current DME Used/Available at Home: Grab bars  Tub or Shower Type: Shower    EXAMINATION/PRESENTATION/DECISION MAKING:   Critical Behavior:  Neurologic State: Alert  Orientation Level: Oriented X4  Cognition: Follows commands    Range Of Motion:  AROM: Grossly decreased, non-functional (RLE grossly decreased)           PROM: Within functional limits           Strength:    Strength: Grossly decreased, non-functional (RLE grossly decreased 1 to 2-/5)        Tone & Sensation:   Tone: Normal        Sensation: Intact         Coordination:  Coordination: Grossly decreased, non-functional (RLE grossly decreased)  Vision:   Tracking: Able to track stimulus in all quadrants w/o difficulty  Saccades: Within Defined Limits  Acuity: Able to read clock/calendar on wall without difficulty; Able to read employee name badge without difficulty (wears reading glasses at baseline)  Corrective Lenses: Reading glasses  Functional Mobility:  Bed Mobility:  Rolling: Moderate assistance  Supine to Sit: Maximum assistance; Moderate assistance;Assist x2  Sit to Supine:  (in recliner)  Scooting: Moderate assistance;Maximum assistance  Transfers:  Sit to Stand: Moderate assistance;Maximum assistance;Assist x2  Stand to Sit: Moderate assistance;Assist x2  Stand Pivot Transfers: Maximum assistance;Assist x2 (bilateral HHA)          Balance:   Sitting: Impaired; Without support  Sitting - Static: Fair (occasional)  Sitting - Dynamic: Poor (constant support); Prop sitting  Standing: Impaired; With support  Standing - Static: Constant support;Fair;Poor  Standing - Dynamic : Constant support;Poor  Ambulation/Gait Training:       Gait Description (WDL):  (unable)         Functional Measure  Gamble Balance Test:    Sitting to Standin  Standing Unsupported: 0  Sitting with Back Unsupported: 2  Standing to Sittin  Transfers: 0  Standing Unsupported with Eyes Closed: 0  Standing Unsupported with Feet Together: 0  Reach Forward with Outstretched Arm: 0   Object: 0  Turn to Look Over Shoulders: 0  Turn 360 Degrees: 0  Alternate Foot on Step/Stool: 0  Standing Unsupported One Foot in Front: 0  Stand on One Le  Total: 2         56=Maximum possible score;   0-20=High fall risk  21-40=Moderate fall risk   41-56=Low fall risk        Physical Therapy Evaluation Charge Determination   History Examination Presentation Decision-Making   HIGH Complexity :3+ comorbidities / personal factors will impact the outcome/ POC  LOW Complexity : 1-2 Standardized tests and measures addressing body structure, function, activity limitation and / or participation in recreation  MEDIUM Complexity : Evolving with changing characteristics  MEDIUM Complexity : FOTO score of 26-74      Based on the above components, the patient evaluation is determined to be of the following complexity level: MEDIUM    Pain Rating:  No pain reported     Activity Tolerance:   Fair, SpO2 stable on RA, and requires rest breaks      After treatment patient left in no apparent distress:   Sitting in chair, Call bell within reach, and Bed / chair alarm activated    COMMUNICATION/EDUCATION:   The patients plan of care was discussed with: Occupational therapist, Registered nurse, and Case management. Patient was educated regarding his deficit(s) of R hemiparesis, slow processing, poor balance as this relates to his diagnosis of CVA. He demonstrated Fair understanding as evidenced by discussion of deficits. Patient and/or family was verbally educated on the BE FAST acronym for signs/symptoms of CVA and TIA. BE FAST was written on patient's communication board  for visual education and reinforcement. All questions answered with patient indicating fair understanding. Fall prevention education was provided and the patient/caregiver indicated understanding. and Patient/family have participated as able in goal setting and plan of care.     Thank you for this referral.  Beni García [place], PT   Time Calculation: 34 mins

## 2023-02-09 NOTE — PROGRESS NOTES
[facility] FLOOR Progress Note    Admit Date: 2023  POD: 1 Day Post-Op      Procedure:  Procedure(s):  TRANSCATHETER AORTIC VALVE REPLACEMENT RIGHT TRANSFEMORAL 26 S 3  TRANSCATHETER AORTIC VALVE REPLACEMENT (CATH)    Subjective:   Pt seen with Dr. Ronal Perla. Tmax 99.8, room air. Still with right leg weakness and feels disappointed about this.     Objective:     Visit Vitals  BP (!) 146/64   Pulse 81   Temp 99.4 °F (37.4 °C)   Resp 21   Ht 5' 10\" (1.778 m)   Wt 119 lb 14.9 oz (54.4 kg)   SpO2 97%   BMI 17.21 kg/m²     Temp (24hrs), Av.3 °F (36.8 °C), Min:97.5 °F (36.4 °C), Max:99.8 °F (37.7 °C)      Last 24hr Input/Output:    Intake/Output Summary (Last 24 hours) at 2023 0817  Last data filed at 2023 0622  Gross per 24 hour   Intake 275 ml   Output 470 ml   Net -195 ml        EKG/Rhythm:   EKG RESULTS       Procedure Component Value Units Date/Time    EKG, 12 LEAD, INITIAL [[id number]]     Order Status: Sent     EKG, 12 LEAD, INITIAL [[id number]] Collected: 23 1038    Order Status: Completed Updated: 23 1607     Ventricular Rate 73 BPM      Atrial Rate 73 BPM      P-R Interval 152 ms      QRS Duration 88 ms      Q-T Interval 410 ms      QTC Calculation (Bezet) 451 ms      Calculated P Axis 65 degrees      Calculated R Axis 58 degrees      Calculated T Axis -43 degrees      Diagnosis --     Sinus rhythm with premature atrial complexes  Left ventricular hypertrophy with repolarization abnormality ( Sokolow-Terrell )  Abnormal ECG  When compared with ECG of 2023 08:37,  premature atrial complexes are now present  ST elevation has replaced ST depression in Anterior leads  Confirmed by David Barber MD ([id number]) on 2023 4:07:00 PM      EKG, 12 LEAD, INITIAL [[id number]] Collected: 23 0837    Order Status: Completed Updated: 23 1145     Ventricular Rate 97 BPM      Atrial Rate 97 BPM      P-R Interval 140 ms      QRS Duration 88 ms      Q-T Interval 350 ms      QTC Calculation (Bezet) 444 ms      Calculated P Axis 70 degrees      Calculated R Axis 71 degrees      Calculated T Axis 75 degrees      Diagnosis --     Normal sinus rhythm  Voltage criteria for left ventricular hypertrophy  Nonspecific ST abnormality    No previous ECGs available  Confirmed by Cesar Foster M.D., Nadia Calderón ([id number]) on 2/2/2023 11:44:58 AM              Oxygen: Room air    CXR:   CXR Results  (Last 48 hours)                 02/08/23 1037  XR CHEST PORT Final result    Impression:      Aortic valve replacement. Mild interstitial edema pattern. Narrative:  EXAM:  XR CHEST PORT       INDICATION: postop heart       COMPARISON: Chest radiographs 2/1/2023       TECHNIQUE: Semierect portable chest AP view       FINDINGS:        Aortic valve replacement. Cardiomediastinal silhouette is within normal limits   of size. Hiatal hernia again noted. Central pulmonary congestion and trace   interstitial edema pattern. Pleural spaces are grossly clear. Admission Weight: Last Weight   Weight: 120 lb (54.4 kg) Weight: 119 lb 14.9 oz (54.4 kg)       EXAM:  General: No acute distress   Lungs:   Clear to auscultation bilaterally. B Groin Sites:  No erythema, drainage or swelling. Dressings removed   Heart:  Regular rate and rhythm, S1, S2 normal, no murmur, click, rub or gallop. Abdomen:   Soft, non-tender. Bowel sounds normal. No masses,  No organomegaly. Extremities:  No edema. PPP   Neurologic:  Right leg weakness.       Activity:    Diet:      Lab Data Reviewed:   Recent Labs     02/09/23  0216 02/08/23  1651 02/08/23  1025   WBC 9.1  --  6.7   HGB 11.8*  --  10.2*   HCT 37.0  --  31.4*   *  --  112*     --  137   K 4.5  --  3.9   BUN 20  --  18   CREA 1.33*  --  0.73   GLU 95  --  94   GLUCPOC  --  87  --    INR  --   --  1.2*         Assessment:     Active Problems:    Nonrheumatic aortic valve stenosis (8/1/2022)      Aortic stenosis, severe (2/8/2023)             Plan/Recommendations/Medical Decision Making: Aortic Stenosis-s/p RTF 26 S3 + 1 ml  TAVR on 2/8/23. ASA following TAVR. Echo, EKG completed and reviewed by Dr. Shital Torres CVA following TAVR: Several small areas of acute infarct in the left frontal, parietal, and occipital lobes. PT/OT/ST. Continue ASA, Statin. Subacute on chronic HFpEF:  Elevated pBNP on admission of 3333, elevated LVEDP of 29 mmHg prior to TAVR. CXR showing pulmonary edema. Not historically on diuretics. Will monitor for auto-diuresis following TAVR and adjust meds as needed. Elevated creatinine : Likely related to NPO status and IV contrast. Diet now back to normal and encourage PO intake. Will continue to monitor. Avoid hypotension and nephrotoxic agents. HTN: Not currently on antihypertensives  HLD: Statin  BPH: Flomax  Severe malnutrition: (severe body fat loss and muscle mass loss). Has been seen by a dietician and added Ensure and Magic Cup. Hx of Esophageal CA s/p Esophagectomy 2004: No current treatment     Dispo: PT/OT/ST/Cardiac Rehab. Case Management for discharge planning. Anticipate home with family and +/- home therapy in 24-48 hours. Addendum 33 [phone] 74: PT recommending Inpatient rehab. Case management discussed this with the patient who would like to consider going to the skilled unit at [facility] and get therapy there - more of a SNF to do this. He would like to sleep on this and make a decision in the morning. I will check in with him then. Also, His sister-in-law, Eliot Montalvo [phone]. Number updated in the chart for status updates per patient.        Signed By: Beny Wong NP

## 2023-02-09 NOTE — PROGRESS NOTES
[facility] Cardiology Progress Note    Date of Service: 2/9/2023    Subjective:  MRI brain shows several small areas of acute infarction in the left frontal, parietal and occipital lobes. I discussed this at length with . Blossom Leung and shared encouragement that hopefully with PT/OT/speech therapy that he may make a full recovery. He was understandably feeling depressed about the situation and shared that he is afraid of being wheelchair bound for the rest of his life.       Objective:    Visit Vitals  BP (!) 146/64   Pulse 81   Temp 99.4 °F (37.4 °C)   Resp 21   Ht 5' 10\" (1.778 m)   Wt 54.4 kg (119 lb 14.9 oz)   SpO2 97%   BMI 17.21 kg/m²         Intake/Output Summary (Last 24 hours) at 2/9/2023 0919  Last data filed at 2/9/2023 0818  Gross per 24 hour   Intake 275 ml   Output 645 ml   Net -370 ml        Physical Exam  GEN: NAD, appears stated age  HEENT: EOMI  NECK: Normal JVP   CV: RRR, normal S1 and S2, I-II/VI systolic flow murmur at LUSB, possible faint diastolic murmur  LUNGS: CTAB, no W/R/R  ABD: Soft, NT/ND  EXT: No edema, 2+ and symmetrical radial pulses b/l  PSYCH: Mood and affect normal  NEURO: Alert, left side of face slightly drooping (but seems to be pre-existing TAVR); mild RUE weakness, moderate RLE weakness    Current Facility-Administered Medications   Medication Dose Route Frequency    trospium (SANCTURA) tablet 20 mg  20 mg Oral DAILY    tamsulosin (FLOMAX) capsule 0.4 mg  0.4 mg Oral DAILY    sodium chloride (NS) flush 5-40 mL  5-40 mL IntraVENous Q8H    sodium chloride (NS) flush 5-40 mL  5-40 mL IntraVENous PRN    acetaminophen (TYLENOL) tablet 650 mg  650 mg Oral Q4H PRN    traMADoL (ULTRAM) tablet  mg   mg Oral Q6H PRN    oxyCODONE-acetaminophen (PERCOCET) 5-325 mg per tablet 1-2 Tablet  1-2 Tablet Oral Q4H PRN    morphine injection 2 mg  2 mg IntraVENous Q2H PRN    naloxone (NARCAN) injection 0.4 mg  0.4 mg IntraVENous PRN    ceFAZolin (ANCEF) 2 g in sterile water (preservative free) 20 mL IV syringe  2 g IntraVENous Q8H    ondansetron (ZOFRAN) injection 4 mg  4 mg IntraVENous Q4H PRN    albuterol (PROVENTIL VENTOLIN) nebulizer solution 2.5 mg  2.5 mg Nebulization Q4H PRN    aspirin chewable tablet 81 mg  81 mg Oral DAILY    bisacodyL (DULCOLAX) suppository 10 mg  10 mg Rectal DAILY PRN    senna-docusate (PERICOLACE) 8.6-50 mg per tablet 1 Tablet  1 Tablet Oral BID    ELECTROLYTE REPLACEMENT NOTE: Nurse to review Serum Potassium and Magnesuim levels and Initiate Electrolyte Replacement Protocol as needed  1 Each Other PRN    melatonin tablet 3 mg  3 mg Oral QHS PRN    hydrALAZINE (APRESOLINE) 20 mg/mL injection 10 mg  10 mg IntraVENous Q6H PRN       Data Reviewed:  Recent Labs     02/09/23  0216 02/08/23  1025    137   K 4.5 3.9    105   CO2 26 27   GLU 95 94   BUN 20 18   CREA 1.33* 0.73   CA 8.9 8.5   ALB  --  2.9*   ALT  --  13   INR  --  1.2*     Recent Labs     02/09/23  0216 02/08/23  1025   WBC 9.1 6.7   HGB 11.8* 10.2*   HCT 37.0 31.4*   * 112*     No results found for: SDES  No results found for: CULT    All Cardiac Markers in the last 24 hours:  No results found for: CPK, CK, CKMMB, CKMB, RCK3, CKMBT, CKMBPOC, CKNDX, CKND1, COURTNEY, TROPT, TROIQ, PARISH, TROPT, TNIPOC, BNP, BNPP, BNPNT    Telemetry (personally reviewed): sinus rhythm    Echocardiogram:  02/08/23    ECHO ADULT FOLLOW-UP OR LIMITED 02/08/2023 2/8/2023    Interpretation Summary  Limited intraoperative TTE to guide TAVR procedure. Well positioned THV with no significant paravalvular leak. No change in LV systolic function post-TAVR. Signed by: Chandrakant Gerard MD on 2/8/2023  5:13 PM      Assessment/Plan:  1.  Symptomatic, severe aortic stenosis s/p successful percutaneous right transfemoral TAVR using a 26 mm Live 3 Ultra transcatheter valve + 1 mL extra volume   - Continue aspirin 81 mg daily indefinitely   - SBE prophylaxis prior to dental procedures    - Repeat TTE at 1 month with follow-up with me at that time   - Follow up with cardiothoracic surgery team in 1 week after discharge  2. Acute stroke after TAVR   - Several small areas of acute infarction in the left frontal, parietal and occipital lobes   - PT/OT/speech therapy; hopeful for home therapy since he is his wife's caregiver (she has advanced dementia)  3. HTN  4. HL    Thank you for the opportunity to participate in the care of Olivier Worrell and please do not hesitate to contact us should you have any questions. Signed:  Raj Nassar.  Jonathan Huber MD  Structural / [address] [place] [address] [facility]  02/09/23

## 2023-02-09 NOTE — PROGRESS NOTES
Transitions of Care Plan  RUR: 13% - low  Clinical Update: s/p TAVR; post procedure CVA  Consults: Cardiology; SLP; Therapy  Baseline: independent without DME; resides at [facility] w wife  Barriers to Discharge: medical  Disposition:  Healthcare at [facility]  Estimated Discharge Date: 2/10/23    CM spoke with Arnold Mcleod at [facility] and provided update that rehab is recommended for discharge at this time. [facility] is able to [employer] authorization from [employer] for rehab unit at [facility]. Referral sent via [employer] to [facility]. Updated CT Surgery NP. Spoke with patient on option between acute (inpatient) rehab and healthcare unit at [facility]. Explained process for medical acceptance and authorization. Patient requests to sleep on the decision tonight and will update treatment team during rounds tomorrow.     Roland Mason, MPH  Care Manager [facility]  Available via [employer] or

## 2023-02-09 NOTE — PROGRESS NOTES
SPEECH LANGUAGE PATHOLOGY BEDSIDE SWALLOW EVALUATION/ SWALLOW DISCHARGE  Patient: Milka Loco (41 y.o. male)  Date: 2/9/2023  Primary Diagnosis: Aortic stenosis, severe [I35.0]  Procedure(s) (LRB):  TRANSCATHETER AORTIC VALVE REPLACEMENT RIGHT TRANSFEMORAL 26 S 3 (Right)  TRANSCATHETER AORTIC VALVE REPLACEMENT (CATH) (N/A) 1 Day Post-Op   Precautions: fall       ASSESSMENT :  Based on the objective data described below, the patient presents with functional oropharyngeal swallow based on clinical examination . He may have elevated risk for post prandial aspiration given history of esophagectomy, though this was in 2004. He reports having some change to his speech. Will follow up with this if needed. Patient will benefit from skilled intervention to address the above impairments. Patients rehabilitation potential is considered to be Good     PLAN :  Recommendations and Planned Interventions:  Regular diet, thins  Will follow up for communication eval  Frequency/Duration: Patient will be followed by speech-language pathology 3 times a week to address goals. Discharge Recommendations: To Be Determined     SUBJECTIVE:   Patient stated [facility] speech is off.     OBJECTIVE:     Past Medical History:   Diagnosis Date    Arthritis     BPH (benign prostatic hyperplasia)     Esophageal cancer (HCC)     GERD (gastroesophageal reflux disease)     Mixed hyperlipidemia     Nonrheumatic aortic valve stenosis     Primary hypertension     Psoriasis      Past Surgical History:   Procedure Laterality Date    HX CATARACT REMOVAL Bilateral     HX COLONOSCOPY      HX ENDOSCOPY      HX ESOPHAGECTOMY  2004    HX HERNIA REPAIR       Prior Level of Function/Home Situation:    Home Situation  Support Systems: Spouse/Significant Other, Other Family Member(s)  Patient Expects to be Discharged to[de-identified] Home  Diet prior to admission: regular with thins  Current Diet:  regular with thins   Cognitive and Communication Status:  Neurologic State: Alert  Orientation Level: Oriented X4  Cognition: Follows commands, Appropriate decision making, Appropriate for age attention/concentration     Perseveration: No perseveration noted     Oral Assessment:  Oral Assessment  Labial: Left droop (but bilateral retraction)  Dentition: Intact  Oral Hygiene: moist, clean  Lingual: Right deviation  Mandible: No impairment  P.O. Trials:  Patient Position: seated in chair  Vocal quality prior to P.O.: No impairment  Consistency Presented: Thin liquid; Solid;Puree  How Presented: Self-fed/presented;Spoon;Straw     Bolus Acceptance: No impairment  Bolus Formation/Control: No impairment     Propulsion: No impairment  Oral Residue: None        Aspiration Signs/Symptoms: None                            Pain:  Pain Scale 1: Numeric (0 - 10)  Pain Intensity 1: 0       After treatment:   Patient left in no apparent distress sitting up in chair, Call bell within reach, and Nursing notified    COMMUNICATION/EDUCATION:   Patient was educated regarding purpose of SLP visit. He participated. The patient's plan of care including recommendations, planned interventions, and recommended diet changes were discussed with: Registered nurse. Patient/family agree to work toward stated goals and plan of care.     Thank you for this referral.  RANDA Che  Time Calculation: 14 mins

## 2023-02-10 VITALS
TEMPERATURE: 98.1 F | SYSTOLIC BLOOD PRESSURE: 141 MMHG | WEIGHT: 119.93 LBS | DIASTOLIC BLOOD PRESSURE: 60 MMHG | OXYGEN SATURATION: 97 % | HEIGHT: 70 IN | RESPIRATION RATE: 16 BRPM | BODY MASS INDEX: 17.17 KG/M2 | HEART RATE: 126 BPM

## 2023-02-10 LAB
ANION GAP SERPL CALC-SCNC: 7 MMOL/L (ref 5–15)
ATRIAL RATE: 79 BPM
ATRIAL RATE: 82 BPM
ATRIAL RATE: 83 BPM
BUN SERPL-MCNC: 21 MG/DL (ref 6–20)
BUN/CREAT SERPL: 26 (ref 12–20)
CALCIUM SERPL-MCNC: 8.8 MG/DL (ref 8.5–10.1)
CALCULATED P AXIS, ECG09: 62 DEGREES
CALCULATED P AXIS, ECG09: 64 DEGREES
CALCULATED P AXIS, ECG09: 69 DEGREES
CALCULATED R AXIS, ECG10: -16 DEGREES
CALCULATED R AXIS, ECG10: -41 DEGREES
CALCULATED R AXIS, ECG10: -42 DEGREES
CALCULATED T AXIS, ECG11: 110 DEGREES
CALCULATED T AXIS, ECG11: 111 DEGREES
CALCULATED T AXIS, ECG11: 114 DEGREES
CHLORIDE SERPL-SCNC: 105 MMOL/L (ref 97–108)
CHOLEST SERPL-MCNC: 152 MG/DL
CO2 SERPL-SCNC: 28 MMOL/L (ref 21–32)
CREAT SERPL-MCNC: 0.81 MG/DL (ref 0.7–1.3)
DIAGNOSIS, 93000: NORMAL
ERYTHROCYTE [DISTWIDTH] IN BLOOD BY AUTOMATED COUNT: 14.4 % (ref 11.5–14.5)
GLUCOSE SERPL-MCNC: 88 MG/DL (ref 65–100)
HCT VFR BLD AUTO: 35.6 % (ref 36.6–50.3)
HDLC SERPL-MCNC: 56 MG/DL
HDLC SERPL: 2.7 (ref 0–5)
HGB BLD-MCNC: 11.4 G/DL (ref 12.1–17)
LDLC SERPL CALC-MCNC: 78.8 MG/DL (ref 0–100)
MCH RBC QN AUTO: 33.4 PG (ref 26–34)
MCHC RBC AUTO-ENTMCNC: 32 G/DL (ref 30–36.5)
MCV RBC AUTO: 104.4 FL (ref 80–99)
NRBC # BLD: 0 K/UL (ref 0–0.01)
NRBC BLD-RTO: 0 PER 100 WBC
P-R INTERVAL, ECG05: 134 MS
P-R INTERVAL, ECG05: 136 MS
P-R INTERVAL, ECG05: 138 MS
PLATELET # BLD AUTO: 115 K/UL (ref 150–400)
PMV BLD AUTO: 11.3 FL (ref 8.9–12.9)
POTASSIUM SERPL-SCNC: 4.2 MMOL/L (ref 3.5–5.1)
Q-T INTERVAL, ECG07: 412 MS
QRS DURATION, ECG06: 148 MS
QRS DURATION, ECG06: 150 MS
QRS DURATION, ECG06: 150 MS
QTC CALCULATION (BEZET), ECG08: 472 MS
QTC CALCULATION (BEZET), ECG08: 481 MS
QTC CALCULATION (BEZET), ECG08: 484 MS
RBC # BLD AUTO: 3.41 M/UL (ref 4.1–5.7)
SODIUM SERPL-SCNC: 140 MMOL/L (ref 136–145)
TRIGL SERPL-MCNC: 86 MG/DL (ref ?–150)
VENTRICULAR RATE, ECG03: 79 BPM
VENTRICULAR RATE, ECG03: 82 BPM
VENTRICULAR RATE, ECG03: 83 BPM
VLDLC SERPL CALC-MCNC: 17.2 MG/DL
WBC # BLD AUTO: 6.5 K/UL (ref 4.1–11.1)

## 2023-02-10 PROCEDURE — 74011250637 HC RX REV CODE- 250/637: Performed by: NURSE PRACTITIONER

## 2023-02-10 PROCEDURE — 36415 COLL VENOUS BLD VENIPUNCTURE: CPT

## 2023-02-10 PROCEDURE — 80048 BASIC METABOLIC PNL TOTAL CA: CPT

## 2023-02-10 PROCEDURE — 92522 EVALUATE SPEECH PRODUCTION: CPT

## 2023-02-10 PROCEDURE — APPSS45 APP SPLIT SHARED TIME 31-45 MINUTES: Performed by: NURSE PRACTITIONER

## 2023-02-10 PROCEDURE — 94760 N-INVAS EAR/PLS OXIMETRY 1: CPT

## 2023-02-10 PROCEDURE — 80061 LIPID PANEL: CPT

## 2023-02-10 PROCEDURE — 85027 COMPLETE CBC AUTOMATED: CPT

## 2023-02-10 PROCEDURE — 74011000250 HC RX REV CODE- 250: Performed by: NURSE PRACTITIONER

## 2023-02-10 RX ORDER — ACETAMINOPHEN 325 MG/1
650 TABLET ORAL
Status: SHIPPED | COMMUNITY
Start: 2023-02-10

## 2023-02-10 RX ADMIN — ASPIRIN 81 MG: 81 TABLET, CHEWABLE ORAL at 08:46

## 2023-02-10 RX ADMIN — TAMSULOSIN HYDROCHLORIDE 0.4 MG: 0.4 CAPSULE ORAL at 08:46

## 2023-02-10 RX ADMIN — SODIUM CHLORIDE, PRESERVATIVE FREE 10 ML: 5 INJECTION INTRAVENOUS at 07:00

## 2023-02-10 RX ADMIN — SODIUM CHLORIDE, PRESERVATIVE FREE 5 ML: 5 INJECTION INTRAVENOUS at 13:47

## 2023-02-10 RX ADMIN — TROSPIUM CHLORIDE 20 MG: 20 TABLET, FILM COATED ORAL at 08:46

## 2023-02-10 RX ADMIN — SENNOSIDES AND DOCUSATE SODIUM 1 TABLET: 50; 8.6 TABLET ORAL at 08:46

## 2023-02-10 NOTE — PROGRESS NOTES
SPEECH LANGUAGE PATHOLOGY EVALUATION/DISCHARGE  Patient: Rosalind Owens (10 y.o. male)  Date: 2/10/2023  Primary Diagnosis: Aortic stenosis, severe [I35.0]  Procedure(s) (LRB):  TRANSCATHETER AORTIC VALVE REPLACEMENT RIGHT TRANSFEMORAL 26 S 3 (Right)  TRANSCATHETER AORTIC VALVE REPLACEMENT (CATH) (N/A) 2 Days Post-Op   Precautions:   Fall    ASSESSMENT :  Based on the objective data described below, the patient presents with functional motor speech intelligibility. Patient seen for SLP evaluation of swallow yesterday with swallow WNL. Patient reported yesterday that he felt his speech was not normal.  Evaluation completed on this date with functional oral motor exam, intact AMR/SMR. Patient 100% intelligible in conversation with adequate respiration, vocal volume, and articulation. No skilled SLP treatment indicated. PLAN :    Discharge Recommendations: Skilled Nursing Facility     SUBJECTIVE:   Patient stated It's easy to talk about yourself.     OBJECTIVE:     Past Medical History:   Diagnosis Date    Arthritis     BPH (benign prostatic hyperplasia)     Esophageal cancer (HCC)     GERD (gastroesophageal reflux disease)     Mixed hyperlipidemia     Nonrheumatic aortic valve stenosis     Primary hypertension     Psoriasis      Past Surgical History:   Procedure Laterality Date    HX CATARACT REMOVAL Bilateral     HX COLONOSCOPY      HX ENDOSCOPY      HX ESOPHAGECTOMY  2004    HX HERNIA REPAIR       Prior Level of Function/Home Situation: independent  Home Situation  Home Environment: Other (comment) (Lives in [address] [place] [address] apartment at [address])  One/Two Story Residence: One story  Living Alone: No  Support Systems: Spouse/Significant Other  Patient Expects to be Discharged to[de-identified] Home with home health  Current DME Used/Available at Home: Grab bars  Tub or Shower Type: Shower  Mental Status:  Neurologic State: Alert  Orientation Level: Oriented X4  Cognition: Follows commands  Perception: Appears intact  Perseveration: No perseveration noted  Safety/Judgement: Awareness of environment, Fall prevention, Insight into deficits  Motor Speech:  Oral-Motor Structure/Motor Speech  Dysarthric Characteristics: None  Intelligibility: No impairment  Overall Impairment Severity: None  Language Comprehension and Expression:              Neuro-Linguistics:                                                  Pragmatics:        Voice:                                                   NOMS:     Pain:  Pain Scale 1: Numeric (0 - 10)  Pain Intensity 1: 0       After treatment:   Patient left in no apparent distress in bed, Call bell within reach, and Nursing notified    COMMUNICATION/EDUCATION:       The patient's plan of care including recommendations, planned interventions, and recommended diet changes were discussed with: Registered nurse.          Thank you for this referral.  Idalmis Roe SLP  Time Calculation: 11 mins

## 2023-02-10 NOTE — PROGRESS NOTES
Transitions of Care Plan  RUR: 12% - low  Clinical Update: s/p TAVR; post procedure CVA  Consults: Cardiology; SLP; Therapy  Baseline: independent without DME; resides at [facility] w wife  Barriers to Discharge:   Disposition:  Healthcare at [facility]  Estimated Discharge Date: 2/10/23    CM updated by Cardiology that patient wishes to return to [facility]. CT Surgery NP updated of same. JIM called Sisi Henson with [facility] to provide update. Healthcare Unit able to accept patient for admission this afternoon. Room 21 ; RN Report [phone]. [facility] able to transport patient via w/c transport this afternoon at [address] [place] [address] Road up is [address] main entrance. JIM updated [facility] RN.     Morris Mcginnis, MPH  Care Manager [facility]  Available via [employer] or

## 2023-02-10 NOTE — PROGRESS NOTES
[facility] FLOOR Progress Note    Admit Date: 2023  POD: 2 Day Post-Op      Procedure:  Procedure(s):  TRANSCATHETER AORTIC VALVE REPLACEMENT RIGHT TRANSFEMORAL 26 S 3  TRANSCATHETER AORTIC VALVE REPLACEMENT (CATH)    Subjective:   Pt seen with Dr. Deb Puri. Tmax 99.3, room air. Still with right leg weakness today.      Objective:     Visit Vitals  BP (!) 118/95   Pulse 84   Temp 98.1 °F (36.7 °C)   Resp 14   Ht 5' 10\" (1.778 m)   Wt 119 lb 14.9 oz (54.4 kg)   SpO2 98%   BMI 17.21 kg/m²     Temp (24hrs), Av.2 °F (36.8 °C), Min:97.8 °F (36.6 °C), Max:99.3 °F (37.4 °C)      Last 24hr Input/Output:    Intake/Output Summary (Last 24 hours) at 2/10/2023 0943  Last data filed at 2/10/2023 0759  Gross per 24 hour   Intake 650 ml   Output 500 ml   Net 150 ml        EKG/Rhythm:   EKG RESULTS       Procedure Component Value Units Date/Time    EKG, 12 LEAD, INITIAL [[id number]] Collected: 23    Order Status: Completed Updated: 02/10/23 4892     Ventricular Rate 83 BPM      Atrial Rate 83 BPM      P-R Interval 134 ms      QRS Duration 148 ms      Q-T Interval 412 ms      QTC Calculation (Bezet) 484 ms      Calculated P Axis 64 degrees      Calculated R Axis -42 degrees      Calculated T Axis 110 degrees      Diagnosis --     Normal sinus rhythm  Left axis deviation  Left bundle branch block  When compared with ECG of 2023 20:37,  MANUAL COMPARISON REQUIRED, DATA IS UNCONFIRMED      EKG, 12 LEAD, INITIAL [[id number]] Collected: 23    Order Status: Completed Updated: 02/10/23 3035     Ventricular Rate 82 BPM      Atrial Rate 82 BPM      P-R Interval 138 ms      QRS Duration 150 ms      Q-T Interval 412 ms      QTC Calculation (Bezet) 481 ms      Calculated P Axis 62 degrees      Calculated R Axis -41 degrees      Calculated T Axis 111 degrees      Diagnosis --     Normal sinus rhythm  Possible Left atrial enlargement  Left axis deviation  Left bundle branch block  When compared with ECG of 09-FEB-2023 12:23,  MANUAL COMPARISON REQUIRED, DATA IS UNCONFIRMED      EKG, 12 LEAD, INITIAL [[id number]] Collected: 02/09/23 1223    Order Status: Completed Updated: 02/09/23 1235     Ventricular Rate 79 BPM      Atrial Rate 79 BPM      P-R Interval 136 ms      QRS Duration 150 ms      Q-T Interval 412 ms      QTC Calculation (Bezet) 472 ms      Calculated P Axis 69 degrees      Calculated R Axis -16 degrees      Calculated T Axis 114 degrees      Diagnosis --     Normal sinus rhythm  Possible Left atrial enlargement  Left bundle branch block  When compared with ECG of 08-FEB-2023 10:38,  premature atrial complexes are no longer present  Left bundle branch block is now present      EKG, 12 LEAD, INITIAL [[id number]] Collected: 02/08/23 1038    Order Status: Completed Updated: 02/08/23 1607     Ventricular Rate 73 BPM      Atrial Rate 73 BPM      P-R Interval 152 ms      QRS Duration 88 ms      Q-T Interval 410 ms      QTC Calculation (Bezet) 451 ms      Calculated P Axis 65 degrees      Calculated R Axis 58 degrees      Calculated T Axis -43 degrees      Diagnosis --     Sinus rhythm with premature atrial complexes  Left ventricular hypertrophy with repolarization abnormality ( Sokolow-Terrell )  Abnormal ECG  When compared with ECG of 01-FEB-2023 08:37,  premature atrial complexes are now present  ST elevation has replaced ST depression in Anterior leads  Confirmed by Ervin Chase MD ([id number]) on 2/8/2023 4:07:00 PM      EKG, 12 LEAD, INITIAL [[id number]] Collected: 02/01/23 0837    Order Status: Completed Updated: 02/02/23 1145     Ventricular Rate 97 BPM      Atrial Rate 97 BPM      P-R Interval 140 ms      QRS Duration 88 ms      Q-T Interval 350 ms      QTC Calculation (Bezet) 444 ms      Calculated P Axis 70 degrees      Calculated R Axis 71 degrees      Calculated T Axis 75 degrees      Diagnosis --     Normal sinus rhythm  Voltage criteria for left ventricular hypertrophy  Nonspecific ST abnormality    No previous ECGs available  Confirmed by Arlyn Bueno M.D., Jeffrey Hurt ([id number]) on 2/2/2023 11:44:58 AM              Oxygen: Room air    CXR:   CXR Results  (Last 48 hours)                 02/08/23 1037  XR CHEST PORT Final result    Impression:      Aortic valve replacement. Mild interstitial edema pattern. Narrative:  EXAM:  XR CHEST PORT       INDICATION: postop heart       COMPARISON: Chest radiographs 2/1/2023       TECHNIQUE: Semierect portable chest AP view       FINDINGS:        Aortic valve replacement. Cardiomediastinal silhouette is within normal limits   of size. Hiatal hernia again noted. Central pulmonary congestion and trace   interstitial edema pattern. Pleural spaces are grossly clear. Admission Weight: Last Weight   Weight: 120 lb (54.4 kg) Weight: 119 lb 14.9 oz (54.4 kg)       EXAM:  General: No acute distress   Lungs:   Clear to auscultation bilaterally. B Groin Sites:  No erythema, drainage or swelling. Dressings removed   Heart:  Regular rate and rhythm, S1, S2 normal, no murmur, click, rub or gallop. Abdomen:   Soft, non-tender. Bowel sounds normal. No masses,  No organomegaly. Extremities:  No edema. PPP   Neurologic:  Right leg weakness. Activity:    Diet:      Lab Data Reviewed:   Recent Labs     02/10/23  0334 02/09/23  0216 02/08/23  1651 02/08/23  1025   WBC 6.5 9.1  --  6.7   HGB 11.4* 11.8*  --  10.2*   HCT 35.6* 37.0  --  31.4*   * 129*  --  112*   NA  --  139  --  137   K  --  4.5  --  3.9   BUN  --  20  --  18   CREA  --  1.33*  --  0.73   GLU  --  95  --  94   GLUCPOC  --   --  87  --    INR  --   --   --  1.2*         Assessment:     Active Problems:    Nonrheumatic aortic valve stenosis (8/1/2022)      Aortic stenosis, severe (2/8/2023)      Severe protein-calorie malnutrition ([address] (2/9/2023)           Plan/Recommendations/Medical Decision Making: Aortic Stenosis-s/p RTF 26 S3 + 1 ml  TAVR on 2/8/23. ASA following TAVR.  Echo, EKG completed and reviewed by Dr. Porsha Muhammad. New LBBB following TAVR. Will plan to DC with Event monitor from S offices. Acute CVA following TAVR: Several small areas of acute infarct in the left frontal, parietal, and occipital lobes. PT/OT/ST. Continue ASA, Statin. Elevated creatinine - resolved: Likely related to volume status and IV contrast.  Avoid hypotension and nephrotoxic agents. Repeat BMP today with return to normal function. Subacute on chronic HFpEF:  Elevated pBNP on admission of 3333, elevated LVEDP of 29 mmHg prior to TAVR. CXR showing pulmonary edema. Not historically on diuretics. Will monitor for auto-diuresis following TAVR and adjust meds as needed. HTN: Not currently on antihypertensives  HLD: Statin  BPH: Flomax  Severe malnutrition: (severe body fat loss and muscle mass loss). Has been seen by a dietician and added Ensure and Magic Cup. Hx of Esophageal CA s/p Esophagectomy 2004: No current treatment   Thrombocytopenia: platelets 769L today. Ok to continue ASA and continue to monitor    Dispo: PT/OT/ST/Cardiac Rehab. Case Management for discharge planning. PT/OT recommending IPR but patient may prefer to go to the skilled facility where he lives in independent living. To make decision today and work on arrangements.     Signed By: Amara Shahid NP

## 2023-02-10 NOTE — DISCHARGE SUMMARY
hospitals Discharge Summary     Patient ID:  Álvaro Cobb  [id number]  72 y.o.  1940    Admit date: 2/8/2023    Discharge date: 2/10/2023     Admitting Physician: Brady Rosenberg MD     Referring Cardiologist:  Dr. Nehal Manning    PCP:  Hui Blank MD     Admitting Diagnoses: Aortic Stenosis    Discharge Diagnoses:     Hospital Problems  Date Reviewed: 2/6/2023            Codes Class Noted POA    Severe protein-calorie malnutrition ([address] (Chronic) ICD-10-CM: E10  ICD-9-CM: 262  2/9/2023 Yes        Aortic stenosis, severe ICD-10-CM: I35.0  ICD-9-CM: 424.1  2/8/2023 Unknown        Nonrheumatic aortic valve stenosis ICD-10-CM: I35.0  ICD-9-CM: 424.1  8/1/2022 Yes           Discharged Condition: stable    Disposition: transferred to [facility] skilled unit    Procedures for this admission:  Procedure(s):  TRANSCATHETER AORTIC VALVE REPLACEMENT RIGHT TRANSFEMORAL 26 S 3  TRANSCATHETER AORTIC VALVE REPLACEMENT (CATH)    Discharge Medications:      My Medications        START taking these medications        Instructions Each Dose to Equal Morning Noon Evening Bedtime   acetaminophen 325 mg tablet  Commonly known as: TYLENOL    Your last dose was: Your next dose is: Take 2 Tablets by mouth every four (4) hours as needed for Pain. 650 mg                        CONTINUE taking these medications        Instructions Each Dose to Equal Morning Noon Evening Bedtime   aspirin delayed-release 81 mg tablet    Your last dose was: Your next dose is: Take 1 Tablet by mouth in the morning. Indications: coronary artery disease   81 mg                 COQ10  PO    Your last dose was: Your next dose is: Take 1 Tablet by mouth daily. 1 Tablet                 mirabegron ER 25 mg ER tablet  Commonly known as: MYRBETRIQ    Your last dose was: Your next dose is: Take 25 mg by mouth in the morning.    25 mg                 multivitamin tablet  Commonly known as: ONE A DAY    Your last dose was: Your next dose is: Take 1 Tablet by mouth in the morning. 1 Tablet                 OCUVITE PRESERVISION PO    Your last dose was: Your next dose is: Take  by mouth daily. simvastatin 20 mg tablet  Commonly known as: ZOCOR    Your last dose was: Your next dose is: Take 20 mg by mouth nightly. 20 mg                 triamcinolone acetonide 0.1 % topical cream  Commonly known as: KENALOG    Your last dose was: Your next dose is:         Apply  to affected area as needed for Skin Irritation. use thin layer                         STOP taking these medications      tamsulosin 0.4 mg capsule  Commonly known as: FLOMAX                   HPI: Copied from H&P dated 2/1/23    Alfredo Ernandez is a 81 y.o. male with PMHx of Aortic Stenosis, HTN, HLD, BPH, Hx of Esophageal CA s/p Esophagectomy in 2004, that is referred to the [address] [place] [address] by Dr. Jatin Moss for interventional evaluation of  AS. He was last seen in the valve clinic in August 2022. At that time, he was mildly symptomatic and wasn't ready to proceed with TAVR. He returns today as his symptoms have been getting worse and he is now ready to proceed. He states that he has known about his murmur for many years and it has remained under surveillance. He only first noticed symptoms in June 2022. He was outside carrying a ladder and became dizzy and dyspneic. He stopped to rest and his symptoms resolved. Winter of 2021/22 he ran up 6 flights of stairs without much issue. He has never had syncope and no recent fall. He had continued to work as a  until June 2022. He also used to [employer] but hasn't done that in a while. Since we last saw him, he has been getting more winded when going up the stairs. He also has noted some episode which he describes as feeling \"out of body\" or a dizzy feeling.  He denies LE edema, CP, Palpitations, orthopena, PND, GIB, or HF hospitalization in the last year. He recently retired to [place] area. He lives with his wife in [place]. She has some memory issues and he helps her with that. His sister-in-law also lives there and could help following a surgery. He is completely independent in his ADLs. He does not smoke, drink alcohol, or use recreational drugs    Hospital Course: Simi Chou was taken to the OR on 2/8/23 for a transcatheter aortic valve replacement with RTF 26 mm + 1 ml extra volume S3 by Nacho Izaguirre. He was taken from the OR to PACU/CVSU. In CVSU, the patient was noted to have right leg weakness and difficulty sitting up - a code stroke was called and he was found to have an acute CVA following TAVR. He worked with PT/OT/ST  who made recommendations for discharge to an [facility] based on his right leg weakness. However, the patient chose to be discharged to a skilled facility where he lives to remain closer to his wife who has dementia. He will get PT/OT/ST there to continue his rehab. He was felt medically stable for DC 02/10/23 with the following assessment and plan: Aortic Stenosis-s/p RTF 26 S3 + 1 ml  TAVR on 2/8/23. ASA following TAVR. Echo, EKG completed and reviewed by Dr. Porsha Muhammad. New LBBB following TAVR. Will plan to DC with Event monitor from [facility] offices. Acute CVA following TAVR: Several small areas of acute infarct in the left frontal, parietal, and occipital lobes. PT/OT/ST. Continue ASA, Statin. Elevated creatinine - resolved: Likely related to volume status and IV contrast.  Avoid hypotension and nephrotoxic agents. Repeat BMP today with return to normal function. Subacute on chronic HFpEF:  Elevated pBNP on admission of 3333, elevated LVEDP of 29 mmHg prior to TAVR. CXR showing pulmonary edema. Not historically on diuretics. Will monitor for auto-diuresis following TAVR and adjust meds as needed.    HTN: Not currently on antihypertensives  HLD: Statin  BPH: Flomax  Severe malnutrition: (severe body fat loss and muscle mass loss). Has been seen by a dietician and added Ensure and Magic Cup. Hx of Esophageal CA s/p Esophagectomy 2004: No current treatment   Thrombocytopenia: platelets 349X today. Ok to continue ASA and continue to monitor      Referral to outpatient cardiac rehab made. Discharge Vital Signs: Visit Vitals  BP (!) 141/60 (BP 1 Location: Left upper arm, BP Patient Position: Semi fowlers)   Pulse 93   Temp 98.1 °F (36.7 °C)   Resp 16   Ht 5' 10\" (1.778 m)   Wt 119 lb 14.9 oz (54.4 kg)   SpO2 97%   BMI 17.21 kg/m²       Labs:   Recent Labs     02/10/23  0758 02/10/23  0334 02/09/23  0216 02/08/23  1651 02/08/23  1025   WBC  --  6.5   < >  --  6.7   HGB  --  11.4*   < >  --  10.2*   HCT  --  35.6*   < >  --  31.4*   PLT  --  115*   < >  --  112*     --    < >  --  137   K 4.2  --    < >  --  3.9   BUN 21*  --    < >  --  18   CREA 0.81  --    < >  --  0.73   GLU 88  --    < >  --  94   GLUCPOC  --   --   --  87  --    INR  --   --   --   --  1.2*    < > = values in this interval not displayed.        Diagnostics:   EKG Results       Procedure 720 Value Units Date/Time    EKG, 12 LEAD, INITIAL [[id number]] Collected: 02/09/23 2037    Order Status: Completed Updated: 02/10/23 0702     Ventricular Rate 82 BPM      Atrial Rate 82 BPM      P-R Interval 138 ms      QRS Duration 150 ms      Q-T Interval 412 ms      QTC Calculation (Bezet) 481 ms      Calculated P Axis 62 degrees      Calculated R Axis -41 degrees      Calculated T Axis 111 degrees      Diagnosis --     Normal sinus rhythm  Possible Left atrial enlargement  Left axis deviation  Left bundle branch block  When compared with ECG of 09-FEB-2023 12:23,  MANUAL COMPARISON REQUIRED, DATA IS UNCONFIRMED      EKG, 12 LEAD, INITIAL [[id number]] Collected: 02/09/23 2038    Order Status: Completed Updated: 02/10/23 0702     Ventricular Rate 83 BPM      Atrial Rate 83 BPM      P-R Interval 134 ms      QRS Duration 148 ms      Q-T Interval 412 ms      QTC Calculation (Bezet) 484 ms      Calculated P Axis 64 degrees      Calculated R Axis -42 degrees      Calculated T Axis 110 degrees      Diagnosis --     Normal sinus rhythm  Left axis deviation  Left bundle branch block  When compared with ECG of 09-FEB-2023 20:37,  MANUAL COMPARISON REQUIRED, DATA IS UNCONFIRMED      EKG, 12 LEAD, INITIAL [[id number]] Collected: 02/09/23 1223    Order Status: Completed Updated: 02/09/23 1235     Ventricular Rate 79 BPM      Atrial Rate 79 BPM      P-R Interval 136 ms      QRS Duration 150 ms      Q-T Interval 412 ms      QTC Calculation (Bezet) 472 ms      Calculated P Axis 69 degrees      Calculated R Axis -16 degrees      Calculated T Axis 114 degrees      Diagnosis --     Normal sinus rhythm  Possible Left atrial enlargement  Left bundle branch block  When compared with ECG of 08-FEB-2023 10:38,  premature atrial complexes are no longer present  Left bundle branch block is now present      EKG, 12 LEAD, INITIAL [[id number]] Collected: 02/08/23 1038    Order Status: Completed Updated: 02/08/23 1607     Ventricular Rate 73 BPM      Atrial Rate 73 BPM      P-R Interval 152 ms      QRS Duration 88 ms      Q-T Interval 410 ms      QTC Calculation (Bezet) 451 ms      Calculated P Axis 65 degrees      Calculated R Axis 58 degrees      Calculated T Axis -43 degrees      Diagnosis --     Sinus rhythm with premature atrial complexes  Left ventricular hypertrophy with repolarization abnormality ( Sokolow-Terrell )  Abnormal ECG  When compared with ECG of 01-FEB-2023 08:37,  premature atrial complexes are now present  ST elevation has replaced ST depression in Anterior leads  Confirmed by Paula Hendricks MD ([id number]) on 2/8/2023 4:07:00 PM            02/08/23    ECHO ADULT COMPLETE 02/09/2023 2/9/2023    Interpretation Summary    Left Ventricle: Normal left ventricular systolic function with a visually estimated EF of 60 - 65%.  Left ventricle size is normal. Mildly increased wall thickness. Normal wall motion. Grade I diastolic dysfunction with normal LAP. Aortic Valve: 26 mm Watson Live 3 Ultra THV + 1 mL extra volume appropriately positioned transcatheter bioprosthetic valve that is well-seated. Trace to mild paravalvular regurgitation. No stenosis. AV mean gradient is 7.69 mmHg. AV peak velocity is 1.7 m/s. LVOT:AV VTI Index is 0.71. LVOT diameter is 2.2 cm. AV area by continuity VTI is 2.69 cm2. Mitral Valve: Mildly calcified leaflet. Mild annular calcification of the mitral valve. Mild regurgitation. Trace stenosis noted. MV mean gradient is 3.5 mmHg at HR 75 bpm.    Tricuspid Valve: Mildly elevated RVSP. The estimated RVSP is 35 mmHg. Left Atrium: Left atrium is mildly dilated. Signed by: Hollis Carranza MD on 2/9/2023 10:15 AM        Patient Instructions/Follow Up Care:  Discharge instructions were reviewed with the patient and family present. Questions were also answered at this time. Prescriptions and medications were reviewed. The patient has a follow up appointment with the Nurse Practitioner or [de-identified] Assistant on 2/13 by phone and with Dr. Yulia Hull NP on 3/15. The patient was also instructed to follow up with his primary care physician as needed. The patient and family were encouraged to call with any questions or concerns.        Signed:  Cliff Crowder NP  2/10/2023  11:37 AM

## 2023-02-10 NOTE — PROGRESS NOTES
[facility] Cardiology Progress Note    Date of Service: 2/10/2023    Subjective:  He seems to be in much better spirits today. He is encouraged that he has more strength in his right leg. He has evaluated options and would like to go to [address] [place] [address] rather than [facility]. His wife has advanced dementia and he needs to be there for her. I agree with this plan. I discussed with the .     Objective:    Visit Vitals  BP (!) 118/95   Pulse (!) 105   Temp 98.1 °F (36.7 °C)   Resp 14   Ht 5' 10\" (1.778 m)   Wt 54.4 kg (119 lb 14.9 oz)   SpO2 98%   BMI 17.21 kg/m²         Intake/Output Summary (Last 24 hours) at 2/10/2023 1046  Last data filed at 2/10/2023 0759  Gross per 24 hour   Intake 650 ml   Output 500 ml   Net 150 ml          Physical Exam  GEN: NAD, appears stated age  [de-identified]: EOMI  NECK: Normal JVP   CV: RRR, normal S1 and S2, I-II/VI systolic flow murmur at LUSB, no diastolic murmur today  LUNGS: Minimally diminished BS at R>L lung bases  ABD: Soft, NT/ND  EXT: No edema, 2+ and symmetrical radial pulses b/l  PSYCH: Mood and affect normal  NEURO: Alert, no clear facial droop, able to lift right leg from the hip against gravity and some against resistance    Current Facility-Administered Medications   Medication Dose Route Frequency    trospium (SANCTURA) tablet 20 mg  20 mg Oral DAILY    tamsulosin (FLOMAX) capsule 0.4 mg  0.4 mg Oral DAILY    sodium chloride (NS) flush 5-40 mL  5-40 mL IntraVENous Q8H    sodium chloride (NS) flush 5-40 mL  5-40 mL IntraVENous PRN    acetaminophen (TYLENOL) tablet 650 mg  650 mg Oral Q4H PRN    traMADoL (ULTRAM) tablet  mg   mg Oral Q6H PRN    oxyCODONE-acetaminophen (PERCOCET) 5-325 mg per tablet 1-2 Tablet  1-2 Tablet Oral Q4H PRN    morphine injection 2 mg  2 mg IntraVENous Q2H PRN    naloxone (NARCAN) injection 0.4 mg  0.4 mg IntraVENous PRN    ondansetron (ZOFRAN) injection 4 mg  4 mg IntraVENous Q4H PRN    albuterol (PROVENTIL VENTOLIN) nebulizer solution 2.5 mg  2.5 mg Nebulization Q4H PRN    aspirin chewable tablet 81 mg  81 mg Oral DAILY    bisacodyL (DULCOLAX) suppository 10 mg  10 mg Rectal DAILY PRN    senna-docusate (PERICOLACE) 8.6-50 mg per tablet 1 Tablet  1 Tablet Oral BID    ELECTROLYTE REPLACEMENT NOTE: Nurse to review Serum Potassium and Magnesuim levels and Initiate Electrolyte Replacement Protocol as needed  1 Each Other PRN    melatonin tablet 3 mg  3 mg Oral QHS PRN    hydrALAZINE (APRESOLINE) 20 mg/mL injection 10 mg  10 mg IntraVENous Q6H PRN       Data Reviewed:  Recent Labs     02/10/23  0758 02/09/23  0216 02/08/23  1025    139 137   K 4.2 4.5 3.9    104 105   CO2 28 26 27   GLU 88 95 94   BUN 21* 20 18   CREA 0.81 1.33* 0.73   CA 8.8 8.9 8.5   ALB  --   --  2.9*   ALT  --   --  13   INR  --   --  1.2*       Recent Labs     02/10/23  0334 02/09/23  0216 02/08/23  1025   WBC 6.5 9.1 6.7   HGB 11.4* 11.8* 10.2*   HCT 35.6* 37.0 31.4*   * 129* 112*       No results found for: SDES  No results found for: CULT    All Cardiac Markers in the last 24 hours:  No results found for: CPK, CK, CKMMB, CKMB, RCK3, CKMBT, CKMBPOC, CKNDX, CKND1, COURTNEY, TROPT, TROIQ, PARISH, TROPT, TNIPOC, BNP, BNPP, BNPNT    Telemetry (personally reviewed): sinus rhythm    Echocardiogram:  02/08/23    ECHO ADULT FOLLOW-UP OR LIMITED 02/08/2023 2/8/2023    Interpretation Summary  Limited intraoperative TTE to guide TAVR procedure. Well positioned THV with no significant paravalvular leak. No change in LV systolic function post-TAVR. Signed by: Melissa Ellis MD on 2/8/2023  5:13 PM    TTE 2/9/23:      Left Ventricle: Normal left ventricular systolic function with a visually estimated EF of 60 - 65%. Left ventricle size is normal. Mildly increased wall thickness. Normal wall motion. Grade I diastolic dysfunction with normal LAP.     Aortic Valve: 26 mm Watson Live 3 Ultra THV + 1 mL extra volume appropriately positioned transcatheter bioprosthetic valve that is well-seated. Trace to mild paravalvular regurgitation. No stenosis. AV mean gradient is 7.69 mmHg. AV peak velocity is 1.7 m/s. LVOT:AV VTI Index is 0.71. LVOT diameter is 2.2 cm. AV area by continuity VTI is 2.69 cm2. Mitral Valve: Mildly calcified leaflet. Mild annular calcification of the mitral valve. Mild regurgitation. Trace stenosis noted. MV mean gradient is 3.5 mmHg at HR 75 bpm.    Tricuspid Valve: Mildly elevated RVSP. The estimated RVSP is 35 mmHg. Left Atrium: Left atrium is mildly dilated. Assessment/Plan:  1. Symptomatic, severe aortic stenosis s/p successful percutaneous right transfemoral TAVR using a 26 mm Live 3 Ultra transcatheter valve + 1 mL extra volume   - Continue aspirin 81 mg daily indefinitely   - SBE prophylaxis prior to dental procedures    - Repeat TTE at 1 month with follow-up with me at that time   - Follow up with cardiothoracic surgery team in 1 week after discharge  2. Acute stroke after TAVR   - Several small areas of acute infarction in the left frontal, parietal and occipital lobes   - PT/OT/speech therapy; plan to [facility] with PT/OT/speech therapy  3. HTN  4. HL  5. New LBBB after TAVR   - No high grade heart block after TAVR   - Will discharge with 30 day MCOT; my office will bring this to his hospital room    [facility] to discharge from CV standpoint once he receives his 30 day MCOT. Discussed with CTS and CVSU RN. Thank you for the opportunity to participate in the care of Juan Rodriguez and please do not hesitate to contact us should you have any questions. Signed:  Rajendra Gotti, [address] [place] [address] / [address] [place] [address] [facility]  02/10/23

## 2023-02-10 NOTE — DISCHARGE INSTRUCTIONS
Cardiac Surgery Specialist    [address] [place] [address]                                       [address], [address] [facility]  Office- [phone]  Fax- [phone]       Office- [phone]  Fax- [phone]  _____________________________________________________________  Dr. Moshe Castillo, GILSON Boyer, NP  Dr. Mariel Astudillo, NP     GILSON [address] GILSON Stafford PA-C  ______________________________________________________________     Name:Joe Storey     Surgery & Date: Procedure(s):  TRANSCATHETER AORTIC VALVE REPLACEMENT RIGHT TRANSFEMORAL 26 S 3  TRANSCATHETER AORTIC VALVE REPLACEMENT (CATH)    Discharge Date: 02/10/23     MEDICATIONS:  Please refer to your After Visit Summary for your medication list.       DO NOT TAKE ANY MEDICATIONS THAT ARE NOT ON THIS LIST    INSTRUCTIONS:  NO SMOKING OR TOBACCO PRODUCTS  Do not follow the activity/exercise instructions in your discharge book given to you as an inpatient. You have no activity restrictions. You may shower. Wash all incisions twice daily with mild soap and water. No lotions, ointments or powder. Call the office immediately for any redness, swelling, or drainage from your incision. Take your temperature daily and call for a temperature of 101 degrees or higher or for any symptoms that make you think you have and infection. Weigh yourself each morning. Call if you gain more than 5 pounds in 48 hours. Use the incentive spirometer 6-8 times a day-10 breaths each time. Walk several hundred feet several times daily. DIET  Eat an American Heart Association diet. If you are having trouble with your appetite, eat what you can. Try eating small, frequent meals throughout the day. ACTIVITY  1. You have no activity restrictions. You may resume your daily activities at home, based on your comfort level. You may also drive. FOLLOW UP  Your first follow up appointment will be on 2/13 at 10:30 am  BY PHONE. Our office is located in [address] [place] [address]. Your second follow up appointment will be in four weeks, on 3/15/23 at [address] [place] [address] will need to have an ECHO prior to your appointment time - this will be done in Dr. Katrina Aschoff office. Our office will set that up for you. Please call our office at [phone] if you are unable to make either one of these appointments. You will be receiving a call before your 3 day follow up appointment to begin cardiac rehab. They are programs located at the [address] [place] [address].  The contact information is located in your Cardiac Surgery booklet. Please call if you have not been contacted 2-3 weeks after discharge from the hospital.  We will make an appointment for you with your cardiologist in 4-5 weeks. Consult you primary care physician regarding your influenza &   pneumovax vaccines. 5.   Please bring all medications with you to your appointment.     Signature:___________________________________________________

## 2023-02-10 NOTE — PROGRESS NOTES
0730: Bedside and Verbal shift change report given to SINA Vizcaino (oncoming nurse) by Rogelio Marc RN (offgoing nurse). Report included the following information SBAR, Kardex, MAR, and Cardiac Rhythm NSR .     0800: Per shiftchange report RLE flaccid but upon assessment pt able to lift R leg but still noticably weak comapred to LLE with minimal push/pull motion. 1100: Per MD Jeff Escalera pt will need 30 day heart monitor placed prior to discharge. 30 day heart monitor brought to bedside. 1306: TRANSFER - OUT REPORT:    Verbal report given to SINA Colin (name) on Rahul Suazo  being transferred to [facility]) for routine progression of care       Report consisted of patients Situation, Background, Assessment and   Recommendations(SBAR). Information from the following report(s) SBAR, Kardex, and MAR was reviewed with the receiving nurse. Lines:   Peripheral IV 02/08/23 Left;Posterior Forearm (Active)   Site Assessment Clean, dry, & intact 02/10/23 0800   Phlebitis Assessment 0 02/10/23 0800   Infiltration Assessment 0 02/10/23 0800   Dressing Status Clean, dry, & intact 02/10/23 0800   Dressing Type Transparent;Tape 02/10/23 0800   Hub Color/Line Status Flushed;Capped 02/10/23 0800   Action Taken Open ports on tubing capped 02/10/23 0800   Alcohol Cap Used Yes 02/10/23 0800        Opportunity for questions and clarification was provided. Discussed importance of daily weights and tempeture's in addition to monitoring bilat groin sites, RN informed of red cardiac surgery bracelet. Pt transport with [facility] 251 transport via wheelchair. 1359: Lipid panel add on with this AM labs per NP Gayathri. 1430: Discharge instructions, medication education, and follow up appointments given to patient. Pt verbalized understanding of all discharge instructions. Pt discharged with all belongings including valve replacement card, abx card, and red cardiac surgery bracelet.      Pt provided time for clarification on questions and/ or concerns. Pt verbalized and acknowledged education provided. Pt denies additional questions and/ or concerns at this time. This RN applied 30 day heart monitor, [facility] aware. Bilat groin sites CDI, no bleeding or hematoma present, pulse and sensation present - MARTHA. Care Plan:   Problem: Falls - Risk of  Goal: *Absence of Falls  Description: Document Evan Fall Risk and appropriate interventions in the flowsheet.   Outcome: Progressing Towards Goal  Note: Fall Risk Interventions:      Problem: TIA/CVA Stroke: Day 2 Until Discharge  Goal: Activity/Safety  Outcome: Progressing Towards Goal  Goal: Diagnostic Test/Procedures  Outcome: Progressing Towards Goal  Goal: Nutrition/Diet  Outcome: Progressing Towards Goal  Goal: Discharge Planning  Outcome: Progressing Towards Goal  Goal: Medications  Outcome: Progressing Towards Goal  Goal: Respiratory  Outcome: Progressing Towards Goal  Goal: Treatments/Interventions/Procedures  Outcome: Progressing Towards Goal  Goal: Psychosocial  Outcome: Progressing Towards Goal  Goal: *Verbalizes anxiety and depression are reduced or absent  Outcome: Progressing Towards Goal  Goal: *Absence of aspiration  Outcome: Progressing Towards Goal  Goal: *Absence of deep venous thrombosis signs and symptoms(Stroke Metric)  Outcome: Progressing Towards Goal  Goal: *Optimal pain control at patient's stated goal  Outcome: Progressing Towards Goal  Goal: *Tolerating diet  Outcome: Progressing Towards Goal  Goal: *Ability to perform ADLs and demonstrates progressive mobility and function  Outcome: Progressing Towards Goal  Goal: *Stroke education continued(Stroke Metric)  Outcome: Progressing Towards Goal

## 2023-02-10 NOTE — PROGRESS NOTES
Cardiac Surgery Care Coordinator-  Met with Laruth Cassia TAVR educational folder at the bedside. Reviewed plan of care and discussed planned discharge later today. Encouraged continued use of the incentive spirometer. He is able to pull 1500cc with good effort. Reviewed the importance of daily temp and weight monitoring, discussed groin site care and reviewed signs and symptoms of infection. He will be staying at the rehab side of [facility] [place] [facility]. Red reminder bracelet on left wrist, reviewed purpose of the bracelet and when to call the MD. Provided him with his valve identification and dental prophylaxis card. Discussed the purpose of both. Reminded pt of appts and encouraged participation in the Cardiac Wellness and rehab program after discharge. Encouraged him to verbalize and emotional support given. He is without questions or concerns at this time.  Mansi Vang RN

## 2023-02-10 NOTE — PROGRESS NOTES
0730 Bedside shift change report given to Danielito Golden (oncoming nurse) by Flavio Nassar (offgoing nurse). Report included the following information SBAR, Kardex, ED Summary, Procedure Summary, Intake/Output, MAR, and Recent Results. 2000 Bedside shift change report given to Venancio Smith (oncoming nurse) by Danielito Golden (offgoing nurse). Report included the following information SBAR, Kardex, ED Summary, Procedure Summary, Intake/Output, MAR, and Recent Results.

## 2023-02-13 ENCOUNTER — OFFICE VISIT (OUTPATIENT)
Dept: CARDIOLOGY CLINIC | Age: 83
End: 2023-02-13
Payer: MEDICARE

## 2023-02-13 DIAGNOSIS — Z95.2 S/P TAVR (TRANSCATHETER AORTIC VALVE REPLACEMENT): ICD-10-CM

## 2023-02-13 DIAGNOSIS — I35.0 AORTIC STENOSIS, SEVERE: Primary | ICD-10-CM

## 2023-02-13 PROCEDURE — 99442 PR PHYS/QHP TELEPHONE EVALUATION 11-20 MIN: CPT | Performed by: NURSE PRACTITIONER

## 2023-02-13 PROCEDURE — 1101F PT FALLS ASSESS-DOCD LE1/YR: CPT | Performed by: NURSE PRACTITIONER

## 2023-02-13 NOTE — LETTER
2/13/2023    Patient: Chyna Cintron   YOB: 1940   Date of Visit: 2/13/2023     Marcos Morin MD  [phone]  Via Fax: [phone]    Dear Marcos Morin MD,      Thank you for referring Mr. Janessa Guerra to [address] [facility] for evaluation. My notes for this consultation are attached. If you have questions, please do not hesitate to call me. I look forward to following your patient along with you.       Sincerely,    Huma Spears, CONNOR

## 2023-02-16 NOTE — OP NOTES
SURGEON OPERATIVE NOTE: TRANSFEMORAL LIVE 3 ULTRA TAVR       Patient: Jonathan Toro  YOB: 1940  MRN: [id number]     Date of Procedure:  2/8/2023     PREOPERATIVE DIAGNOSIS:  Symptomatic severe aortic valve stenosis    POSTOPERATIVE DIAGNOSIS:  Symptomatic severe aortic valve stenosis    PROCEDURES PERFORMED:   1. Implantation of catheter-delivered prosthetic aortic heart valve (26 mm Live 3 Ultra + 1 mL extra volume); percutaneous right femoral artery approach (CPT 41533). 2. Percutaneous left femoral artery access under fluoroscopic and ultrasound guidance. 3. Percutaneous right femoral artery access (CPT P5881702) under fluoroscopic and ultrasound guidance (CPT 00249). 4. Introduction of catheter aorta, bilateral (CPT 72012-85). 5. Aortoiliac arteriogram (CPT 81589). 6. Percutaneous left femoral venous access under fluoroscopic guidance. 7. Insertion of temporary transvenous pacemaker (CPT 88122)     CASE SUMMARY:  1. Successful percutaneous right transfemoral TAVR using a 26 mm Live 3 Ultra THV + 1 mL extra volume  2. Transient heart block (for a few seconds just after initial valve deployment) with LBBB for a few minutes and subsequent return to native conduction for the remainder of the case  3. Trace to mild paravalvular leak  4. No vascular complications    CO-SURGEONS:   Dr. Zabrina Hemphill. Evan Tovar Innocent:  None    ANESTHESIA:   MAC    CLINICAL HISTORY:   Jonathan Toro is a 81 y.o. male with severe, symptomatic aortic stenosis. He was evaluated and determined to be intermediate risk for conventional aortic valve replacement surgery. As such, he has been consented for FDA-approved commercial TAVR use, and is now taken to the operating room for endovascular transcatheter aortic valve replacement.      The risks of the procedure including death, stroke, vascular injury, the need for conversion to open surgery, transfusion and the need for permanent pacemaker were discussed with the patient and his family in detail. He signed informed consent. Surgical priority is elective. DESCRIPTION OF PROCEDURE:   After informed consent was obtained, the patient was brought to the operating room and positioned supine on the hybrid OR table. Prophylactic antibiotics were administered preoperatively. Invasive monitoring lines were placed by the cardiothoracic anesthesia team. The patient was prepped and draped from the chin to mid-thighs. Surgical time-out was performed. Under ultrasound guidance, the right common femoral artery is accessed percutaneously and a #6-Rwandan sheath is placed. A J-tipped wire is placed in the descending thoracic aorta. Two separate Perclose devices are deployed for pre-closure. Over an Amplatz super stiff wire with a hand-fashioned J-tip the #6-Rwandan sheath is exchanged for a #14-Rwandan E-sheath. The sheath is flushed and then sewn into place. The left common femoral artery is accessed via micropuncture technique with radiographic and ultrasound guidance and a #6-Rwandan sheath is placed there. The left femoral vein is accessed percutaneously, and a #6-Rwandan venous sheath is placed under fluoroscopic guidance there. The patient is systemically heparinized with 100 units per kg of IV heparin to a goal ACT greater than 250. A transvenous pacing lead is advanced from the left femoral venous sheath up to the right ventricle. The pacemaker is tested and has good pacing capture at <1 mA. A pigtail catheter is advanced via the left femoral artery sheath over a J-tipped wire and positioned in the right-coronary sinus of Valsalva. A thoracic aortogram is obtained to determine the coplanar angle for valve implantation. Calcification of the aortic valve leaflets is evident. The aortic valve is then crossed using an AL-1 catheter and an 0.035\" straight tip guidewire. Simultaneous LV and ascending aortic pressures are recorded.      A Confida 0.035\" guidewire is positioned in the LV apex. Care is taken to avoid contact with the ventricular wall by the transition point of the wire to avoid LV perforation. A balloon aortic valvuloplasty is not performed. Next, a 26 mm Live 3 Ultra device is advanced via the #14-Czech E-sheath and positioned in the descending aorta. The valve is mounted onto the balloon. The camera is then placed Guamanian and the valve is carefully advanced across the aortic arch while applying flexion to the delivery system to prevent trauma to the aortic arch. The camera is then placed back into the coplanar deployment angle and the valve is positioned across the aortic valve. A thoracic aortogram is obtained to correctly position the valve across the native aortic valve. Under rapid ventricular pacing at 180 beats per minute, the valve is carefully and deliberately deployed with nominal volume and seats in a good position at a depth of 85 aortic / 15 ventricular on the non coronary side and 95 aortic / 5 ventricular on the left coronary side. Transthoracic echocardiography shows excellent valve position with no significant paravalvular or valvular aortic insufficiency. After deployment, transvalvular gradient is measured and is minimal. The left ventricular end-diastolic pressure is similar from predeployment, with a well preserved end-diastolic pressure gradient comparing the central aortic diastolic pressure simultaneously with the LVEDP, suggesting physiologically insignificant aortic insufficiency. Thoracic aortography is performed and shows mild paravalvular leak. The decision is made to post-dilate the valve. The valve delivery balloon is re-prepped with 1 mL of extra volume added. The balloon is advanced across the deployed THV. Under rapid ventricular pacing at 180 bpm, the balloon is inflated with good expansion/foreshortening of the THV.      Subsequent thoracic aortography is performed and shows mild paravalvular leak. This is not evident by transthoracic echocardiography. Satisfied with the valve position and function, we turn our attention to the access sites. The #14-Bulgarian E-sheath on the right is removed and the two Perclose devices deployed. Next, the pigtail catheter is pulled down into the abdominal aorta and an iliofemoral pelvic arteriogram is taken. This demonstrates good flow bilaterally with non-flow limiting stenosis at the [facility] access site. There is an excellent pulse in the artery after repair completion and palpable pulses in the right foot, unchanged compared with prior to TAVR. The #6-Bulgarian left femoral arterial sheath is removed and successful hemostasis is obtained with a #6-Bulgarian AngioSeal.    The #6-Bulgarian left femoral venous sheath is removed and successful hemostasis is obtained with manual compression in the hybrid OR per standard protocol. Protamine is administered and hemostasis is obtained. The intraoperative ECG monitoring at the end of the case shows NSR with a narrow QRS. No high degree AV block is present. LV Pressure Pre-TAVR:    195/11/29 mmHg  LV Pressure Post-TAVR:  103/9/12 mmHg    Estimated blood loss: < 50 cc     Complications: None    Disposition: PACU --> CVSU    All sponge, needle, and instrument counts are reported correct at the end of the case. The patient is taken to the PACU in stable condition at the end of procedure. Dr. Isabel Millan and GRAY jointly performed the procedure and all of the critical components. The immediate results of the valve surgery were discussed with the patient's family. I, as co-surgeon, was scrubbed and present for the entire procedure. Implants:   Implant Name Type Inv.  Item Serial No.  Lot No. LRB No. Used Action   VALVE HRT TRANSCATH 26MM YODIT 3 ULTRA - [id number]   VALVE HRT TRANSCATH 26MM YODIT 3 ULTRA [id number] [employer]

## 2023-03-15 ENCOUNTER — OFFICE VISIT (OUTPATIENT)
Dept: CARDIOLOGY CLINIC | Age: 83
End: 2023-03-15
Payer: MEDICARE

## 2023-03-15 VITALS
DIASTOLIC BLOOD PRESSURE: 71 MMHG | BODY MASS INDEX: 16.5 KG/M2 | WEIGHT: 115 LBS | HEART RATE: 91 BPM | SYSTOLIC BLOOD PRESSURE: 165 MMHG | RESPIRATION RATE: 18 BRPM | TEMPERATURE: 97.1 F

## 2023-03-15 DIAGNOSIS — I35.0 NONRHEUMATIC AORTIC VALVE STENOSIS: Primary | ICD-10-CM

## 2023-03-15 DIAGNOSIS — Z95.2 S/P TAVR (TRANSCATHETER AORTIC VALVE REPLACEMENT): ICD-10-CM

## 2023-03-15 PROCEDURE — G8432 DEP SCR NOT DOC, RNG: HCPCS | Performed by: NURSE PRACTITIONER

## 2023-03-15 PROCEDURE — 3077F SYST BP >= 140 MM HG: CPT | Performed by: NURSE PRACTITIONER

## 2023-03-15 PROCEDURE — 1101F PT FALLS ASSESS-DOCD LE1/YR: CPT | Performed by: NURSE PRACTITIONER

## 2023-03-15 PROCEDURE — G8427 DOCREV CUR MEDS BY ELIG CLIN: HCPCS | Performed by: NURSE PRACTITIONER

## 2023-03-15 PROCEDURE — G8419 CALC BMI OUT NRM PARAM NOF/U: HCPCS | Performed by: NURSE PRACTITIONER

## 2023-03-15 PROCEDURE — 1123F ACP DISCUSS/DSCN MKR DOCD: CPT | Performed by: NURSE PRACTITIONER

## 2023-03-15 PROCEDURE — 3078F DIAST BP <80 MM HG: CPT | Performed by: NURSE PRACTITIONER

## 2023-03-15 PROCEDURE — 99214 OFFICE O/P EST MOD 30 MIN: CPT | Performed by: NURSE PRACTITIONER

## 2023-03-15 PROCEDURE — G8536 NO DOC ELDER MAL SCRN: HCPCS | Performed by: NURSE PRACTITIONER

## 2023-03-15 NOTE — PROGRESS NOTES
Patient: Louis Zimmerman   Age: 81 y.o. Patient Care Team:  Zuleyka Martin MD as PCP - General (Family Medicine)  Alysa Hodgson MD ([address] [place] [address] Vascular Surgery)  Tj Cordero MD (Cardiothoracic Surgery)  Kellie Hernandez MD (Cardiothoracic Surgery)    PCP: Zuleyka Martin MD    Cardiologist: Dr. Ainsley Serrano    Diagnosis/Reason for Consultation: The primary encounter diagnosis was Nonrheumatic aortic valve stenosis. A diagnosis of S/P TAVR (transcatheter aortic valve replacement) was also pertinent to this visit. Problem List:   Patient Active Problem List   Diagnosis Code    Nonrheumatic aortic valve stenosis I35.0    Primary hypertension I10    Mixed hyperlipidemia E78.2    BPH (benign prostatic hyperplasia) N40.0    Esophageal cancer (HCC) C15.9    Aortic stenosis, severe I35.0    Severe protein-calorie malnutrition ([facility] [address] E43    S/P TAVR (transcatheter aortic valve replacement) Z95.2         HPI: 81 y.o. y.o. male  S/P transcatheter aortic valve replacement with RTF 26 mm + 1 ml extra volume S3 by [facility] on 2/8/23. He was taken from the OR to PACU/CVSU. In CVSU, the patient was noted to have right leg weakness and difficulty sitting up - a code stroke was called and he was found to have an acute CVA following TAVR. He worked with PT/OT/ST  who made recommendations for discharge to an IPR based on his right leg weakness. However, the patient chose to be discharged to a skilled facility where he lives to remain closer to his wife who has dementia. He will get PT/OT/ST there to continue his rehab. He was felt medically stable for DC 02/10/23. he  is here today for his  One Month Post Op Appointment. The patient has been discharged from SNF and is back in his home with his wife. He graduated from [employer] but continues with his Physical Therapy.  Although he has seen significant improvement since his discharge from the hospital, he is still a ways from his pre-TAVR capabilities. He is walking without any assistive device but does get fatigued after some distance. Because of the fatigue, he will take his rollator along for distance walking or for times when he will have to carry something. He is moving his right arm well and his fine motor skills are almost back to baseline. He no longer feels that he has issues with word finding. He is sleeping and his appetite has seemed good. Any dental pain/concerns: None    NYHA Classification: IB   Class I (Mild): No limitation of physical activity. Ordinary physical activity does not cause undue fatigue, palpitation, or dyspnea. Class II (Mild): Slight limitation of physical activity. Comfortable at rest, but ordinary physical activity results in fatigue, palpitation, or dyspnea. Class III (Moderate): Marked limitation of physical activity. Comfortable at rest, but less than ordinary activity causes fatigue, palpitation, or dyspnea   Class IV (Severe): Unable to carry out any physical activity without discomfort. Symptoms  of cardiac insufficiency at rest.  If any physical activity is undertaken, discomfort is increased.     Angina Classification: 0   Class 0: No symptoms   Class 1: Angina with strenuous exercise   Class 2: Angina with moderate exercise   Class 3: Angina with mild exertion   Walking 1-2 level blocks at normal pace; Climbing 1 flight of stairs at normal pace  Class 4: Angina at any level of physical exertion       Past Medical History:   Diagnosis Date    Arthritis     BPH (benign prostatic hyperplasia)     Esophageal cancer (HCC)     GERD (gastroesophageal reflux disease)     Mixed hyperlipidemia     Nonrheumatic aortic valve stenosis     Primary hypertension     Psoriasis          Past Surgical History:   Procedure Laterality Date    HX CATARACT REMOVAL Bilateral     HX COLONOSCOPY      HX ENDOSCOPY      HX ESOPHAGECTOMY  2004    HX HERNIA REPAIR        Social History     Tobacco Use    Smoking status: Former     Years: 4.00     Types: Cigarettes    Smokeless tobacco: Never   Substance Use Topics    Alcohol use: Never      Family History   Problem Relation Age of Onset    Cancer Mother         breast    Dementia Mother     Heart Disease Father     Cancer Brother         ESOPHAGEAL CANCER AND \"OTHERS\"    Anesth Problems Neg Hx      Prior to Admission medications    Medication Sig Start Date End Date Taking? Authorizing Provider   acetaminophen (TYLENOL) 325 mg tablet Take 2 Tablets by mouth every four (4) hours as needed for Pain. 2/10/23   Hipolito Linker, NP   triamcinolone acetonide (KENALOG) 0.1 % topical cream Apply  to affected area as needed for Skin Irritation. use thin layer    Provider, Historical   aspirin delayed-release 81 mg tablet Take 1 Tablet by mouth in the morning. Indications: coronary artery disease 8/1/22   Jeanette Saldaña MD   simvastatin (ZOCOR) 20 mg tablet Take 20 mg by mouth nightly. Provider, Historical   mirabegron ER (MYRBETRIQ) 25 mg ER tablet Take 25 mg by mouth in the morning. Provider, Historical   multivitamin (ONE A DAY) tablet Take 1 Tablet by mouth in the morning. Provider, Historical   ubidecarenone/vitamin E mixed (COQ10  PO) Take 1 Tablet by mouth daily. Provider, Historical   vit A/vit C/vit E/zinc/copper (OCUVITE PRESERVISION PO) Take  by mouth daily. Provider, Historical       No Known Allergies    Current Medications:   Current Outpatient Medications   Medication Sig Dispense Refill    acetaminophen (TYLENOL) 325 mg tablet Take 2 Tablets by mouth every four (4) hours as needed for Pain.      triamcinolone acetonide (KENALOG) 0.1 % topical cream Apply  to affected area as needed for Skin Irritation. use thin layer      aspirin delayed-release 81 mg tablet Take 1 Tablet by mouth in the morning. Indications: coronary artery disease 90 Tablet 3    simvastatin (ZOCOR) 20 mg tablet Take 20 mg by mouth nightly.       mirabegron ER (MYRBETRIQ) 25 mg ER tablet Take 25 mg by mouth in the morning.      multivitamin (ONE A DAY) tablet Take 1 Tablet by mouth in the morning. ubidecarenone/vitamin E mixed (COQ10  PO) Take 1 Tablet by mouth daily. vit A/vit C/vit E/zinc/copper (OCUVITE PRESERVISION PO) Take  by mouth daily. Vitals: Blood pressure (!) 165/71, pulse 91, temperature 97.1 °F (36.2 °C), resp. rate 18, weight 115 lb (52.2 kg). Allergies: has No Known Allergies.     Review of Systems: Pertinent Positives per HPI   [x]Total of 13 systems reviewed as follows:  Constitutional: Negative fever, negative chills, +fatigue  Eyes:   Negative for amauroses fugax, +glasses  ENT:   Negative sore throat,oral abscess   Endocrine Negative for thyroid replacement Rx; goiter; DM  Respiratory:  Negative chronic cough,sputum production  Cards:   Negative for palpitations, varicosities, claudication, lower extremity edema  GI:   Negative for dysphagia, bleeding, nausea, vomiting, diarrhea, and abdominal pain  Genitourinary: Negative for frequency, dysuria  Integument:  Negative for rash and pruritus  Hematologic:  Negative for easy bruising; bleeding dyscrasia   Musculoskel: Negative for muscle weakness inhibiting ambulation  Neurological:  Negative for stroke, TIA, syncope, dizziness  Behavl/Psych: Negative for feelings of anxiety, depression     Cardiovascular Testing:     EKG:   EKG RESULTS         Procedure Component Value Units Date/Time     EKG, 12 LEAD, INITIAL [[id number]] Collected: 02/09/23 2038     Order Status: Completed Updated: 02/10/23 1656       Ventricular Rate 83 BPM         Atrial Rate 83 BPM         P-R Interval 134 ms         QRS Duration 148 ms         Q-T Interval 412 ms         QTC Calculation (Bezet) 484 ms         Calculated P Axis 64 degrees         Calculated R Axis -42 degrees         Calculated T Axis 110 degrees         Diagnosis --       Normal sinus rhythm  Left axis deviation  Left bundle branch block  When compared with ECG of 09-FEB-2023 20:37,  MANUAL COMPARISON REQUIRED, DATA IS UNCONFIRMED  Confirmed by Garland Flores MD [id number]) on 2/10/2023 4:56:08 PM        EKG, 12 LEAD, INITIAL [[id number]] Collected: 02/09/23 2037     Order Status: Completed Updated: 02/10/23 1656       Ventricular Rate 82 BPM         Atrial Rate 82 BPM         P-R Interval 138 ms         QRS Duration 150 ms         Q-T Interval 412 ms         QTC Calculation (Bezet) 481 ms         Calculated P Axis 62 degrees         Calculated R Axis -41 degrees         Calculated T Axis 111 degrees         Diagnosis --       Normal sinus rhythm  Possible Left atrial enlargement  Left axis deviation  Left bundle branch block  When compared with ECG of 09-FEB-2023 12:23,  MANUAL COMPARISON REQUIRED, DATA IS UNCONFIRMED  Confirmed by Garland Flores MD ([id number]) on 2/10/2023 4:55:46 PM        EKG, 12 LEAD, INITIAL [[id number]] Collected: 02/09/23 1223     Order Status: Completed Updated: 02/10/23 1520       Ventricular Rate 79 BPM         Atrial Rate 79 BPM         P-R Interval 136 ms         QRS Duration 150 ms         Q-T Interval 412 ms         QTC Calculation (Bezet) 472 ms         Calculated P Axis 69 degrees         Calculated R Axis -16 degrees         Calculated T Axis 114 degrees         Diagnosis --       Normal sinus rhythm  Possible Left atrial enlargement  Left bundle branch block  When compared with ECG of 08-FEB-2023 10:38,  premature atrial complexes are no longer present  Left bundle branch block is now present  Confirmed by Garland Flores MD ([id number]) on 2/10/2023 3:20:01 PM        EKG, 12 LEAD, INITIAL [[id number]] Collected: 02/08/23 1038     Order Status: Completed Updated: 02/08/23 1607       Ventricular Rate 73 BPM         Atrial Rate 73 BPM         P-R Interval 152 ms         QRS Duration 88 ms         Q-T Interval 410 ms         QTC Calculation (Bezet) 451 ms         Calculated P Axis 65 degrees         Calculated R Axis 58 degrees         Calculated T Axis -43 degrees         Diagnosis --       Sinus rhythm with premature atrial complexes  Left ventricular hypertrophy with repolarization abnormality ( Sokolow-Terrell )  Abnormal ECG  When compared with ECG of 01-FEB-2023 08:37,  premature atrial complexes are now present  ST elevation has replaced ST depression in Anterior leads  Confirmed by Jennifer Shahid MD ([id number]) on 2/8/2023 4:07:00 PM        EKG, 12 LEAD, INITIAL [[id number]] Collected: 02/01/23 0837     Order Status: Completed Updated: 02/02/23 1145       Ventricular Rate 97 BPM         Atrial Rate 97 BPM         P-R Interval 140 ms         QRS Duration 88 ms         Q-T Interval 350 ms         QTC Calculation (Bezet) 444 ms         Calculated P Axis 70 degrees         Calculated R Axis 71 degrees         Calculated T Axis 75 degrees         Diagnosis --       Normal sinus rhythm  Voltage criteria for left ventricular hypertrophy  Nonspecific ST abnormality     No previous ECGs available  Confirmed by Kelley Carey M.D., [place] ([id number]) on 2/2/2023 11:44:58 AM                      TTE:  02/08/23     ECHO ADULT COMPLETE 02/09/2023 2/9/2023     Interpretation Summary    Left Ventricle: Normal left ventricular systolic function with a visually estimated EF of 60 - 65%. Left ventricle size is normal. Mildly increased wall thickness. Normal wall motion. Grade I diastolic dysfunction with normal LAP. Aortic Valve: 26 mm Watson Live 3 Ultra THV + 1 mL extra volume appropriately positioned transcatheter bioprosthetic valve that is well-seated. Trace to mild paravalvular regurgitation. No stenosis. AV mean gradient is 7.69 mmHg. AV peak velocity is 1.7 m/s. LVOT:AV VTI Index is 0.71. LVOT diameter is 2.2 cm. AV area by continuity VTI is 2.69 cm2. Mitral Valve: Mildly calcified leaflet. Mild annular calcification of the mitral valve. Mild regurgitation. Trace stenosis noted. MV mean gradient is 3.5 mmHg at HR 75 bpm.    Tricuspid Valve: Mildly elevated RVSP.  The estimated RVSP is 35 mmHg. Left Atrium: Left atrium is mildly dilated. Signed by: Hesham Russo MD on 2/9/2023 10:15 AM          TTE:  1 month Echo to be done at [facility] on 3/23/23    Physical Exam:  General: Thin well groomed male appearing stated age accompanied by his wife  Neuro: A&OX3. BAI. PERRL. Steady unassisted gait  Head:Normocephalic. Atraumatic. Symmetrical  Neck: Trachea Midline  Resp: CTA B. No Adv BS/cough/sputum/tachypnea with seated conversation  CV: S1S2 RRR. No appreciable murmur. No JVD/carotid bruits. Pink/warm/dry extremities. No LE peripheral edema  GI:Benign ab. Soft. NT/ND. Active BS  : Voids  Integ: No obvious s/s of infection or breakdown  Musculo/Skeletal: FROM in all major joints. Good muscle tone    Clinic Evaluation:   KCCQ-12: scanned into EMR      Assessment/Plan: Aortic Stenosis-s/p RTF 26 S3 + 1 ml  TAVR on 2/8/23. ASA following TAVR. Echo, EKG as above. Has 1 month Echo on 3/23 and follows with Dr. Luis Art after his Echo  New LBBB following TAVR. Completed event monitor through [facility] offices - no new orders  Acute CVA following TAVR: Several small areas of acute infarct in the left frontal, parietal, and occipital lobes. Continue PT in the outpatient setting. Continue ASA, Statin. Chronic HFpEF:  BP management as needed. HTN: Not currently on antihypertensives  HLD: Statin  BPH: Flomax  Severe malnutrition: (severe body fat loss and muscle mass loss). Has been seen by a dietician and added Ensure. Hx of Esophageal CA s/p Esophagectomy 2004: No current treatment       SBE prophylax reviewed.     May begin Cardiac Rehab     F/U with primary cardiologist - has appt 3/23/23

## 2023-03-15 NOTE — LETTER
3/15/2023    Patient: Arnaldo Doyle   YOB: 1940   Date of Visit: 3/15/2023     Sidney Worrell MD  [phone]  Via Fax: [phone]    Dear Sidney Worrell MD,      Thank you for referring Mr. Jackie Mayfield to [address] [facility] for evaluation. My notes for this consultation are attached. If you have questions, please do not hesitate to call me. I look forward to following your patient along with you.       Sincerely,    Rebecca Tadeo MD

## 2023-05-20 RX ORDER — ASPIRIN 81 MG/1
81 TABLET ORAL DAILY
COMMUNITY
Start: 2022-08-01

## 2023-05-20 RX ORDER — TRIAMCINOLONE ACETONIDE 1 MG/G
CREAM TOPICAL PRN
COMMUNITY

## 2023-05-20 RX ORDER — ACETAMINOPHEN 325 MG/1
650 TABLET ORAL EVERY 4 HOURS PRN
COMMUNITY
Start: 2023-02-10

## 2023-05-20 RX ORDER — SIMVASTATIN 20 MG
20 TABLET ORAL NIGHTLY
COMMUNITY

## 2023-10-04 NOTE — PROGRESS NOTES
Patient: Lilliam Dyson   Age: 81 y.o. Patient Care Team:  Ruby Saleem MD as PCP - General (Family Medicine)  Bib Shaffer MD ([address] [place], [place] [address]e [facility] Surgery)  Katheen Lombard, MD (Cardiothoracic Surgery)  Cruz Pedro MD (Cardiothoracic Surgery)    PCP: Ruby Saleem MD    Cardiologist: Dr. Erika Mesa    Diagnosis/Reason for Consultation: The primary encounter diagnosis was Aortic stenosis, severe. A diagnosis of S/P TAVR (transcatheter aortic valve replacement) was also pertinent to this visit. Problem List:   Patient Active Problem List   Diagnosis Code    Nonrheumatic aortic valve stenosis I35.0    Primary hypertension I10    Mixed hyperlipidemia E78.2    BPH (benign prostatic hyperplasia) N40.0    Esophageal cancer (HCC) C15.9    Aortic stenosis, severe I35.0    Severe protein-calorie malnutrition ([facility] [address] E43         HPI: 81 y.o. y.o. male  S/P transcatheter aortic valve replacement with RTF 26 mm + 1 ml extra volume S3 by Nacho Solorzano on 2/8/23. He was taken from the OR to PACU/CVSU. In CVSU, the patient was noted to have right leg weakness and difficulty sitting up - a code stroke was called and he was found to have an acute CVA following TAVR. He worked with PT/OT/ST  who made recommendations for discharge to an IPR based on his right leg weakness. However, the patient chose to be discharged to a skilled facility where he lives to remain closer to his wife who has dementia. He will get PT/OT/ST there to continue his rehab. He was felt medically stable for DC 02/10/23. He is available by phone today for his initial postoperative visit. He has gotten settled into the SNF part of [facility]. He met with the Speech Therapist and Physical Therapist this morning and they are working on his plan of care. He is still having significant weakness with his right leg and is currently using a wheelchair but feels like it might be a little stronger.  Denies fever, chills, worsening shortness of breath or fatigue, chest pain, orthopnea, PND, dizziness, syncope or recent fall, or issues with his incisions. He continues to wear his event monitor through Dr. Ruben Larson office. NYHA Classification: IB   Class I (Mild): No limitation of physical activity. Ordinary physical activity does not cause undue fatigue, palpitation, or dyspnea. Class II (Mild): Slight limitation of physical activity. Comfortable at rest, but ordinary physical activity results in fatigue, palpitation, or dyspnea. Class III (Moderate): Marked limitation of physical activity. Comfortable at rest, but less than ordinary activity causes fatigue, palpitation, or dyspnea   Class IV (Severe): Unable to carry out any physical activity without discomfort. Symptoms  of cardiac insufficiency at rest.  If any physical activity is undertaken, discomfort is increased.     Angina Classification: 0   Class 0: No symptoms   Class 1: Angina with strenuous exercise   Class 2: Angina with moderate exercise   Class 3: Angina with mild exertion   Walking 1-2 level blocks at normal pace; Climbing 1 flight of stairs at normal pace  Class 4: Angina at any level of physical exertion       Past Medical History:   Diagnosis Date    Arthritis     BPH (benign prostatic hyperplasia)     Esophageal cancer (HCC)     GERD (gastroesophageal reflux disease)     Mixed hyperlipidemia     Nonrheumatic aortic valve stenosis     Primary hypertension     Psoriasis          Past Surgical History:   Procedure Laterality Date    HX CATARACT REMOVAL Bilateral     HX COLONOSCOPY      HX ENDOSCOPY      HX ESOPHAGECTOMY  2004    HX HERNIA REPAIR        Social History     Tobacco Use    Smoking status: Former     Years: 4.00     Types: Cigarettes    Smokeless tobacco: Never   Substance Use Topics    Alcohol use: Never      Family History   Problem Relation Age of Onset    Cancer Mother         breast    Dementia Mother     Heart Disease Father     Cancer Brother         ESOPHAGEAL CANCER AND \"OTHERS\"    Anesth Problems Neg Hx      Prior to Admission medications    Medication Sig Start Date End Date Taking? Authorizing Provider   acetaminophen (TYLENOL) 325 mg tablet Take 2 Tablets by mouth every four (4) hours as needed for Pain. 2/10/23   Mera Parker NP   triamcinolone acetonide (KENALOG) 0.1 % topical cream Apply  to affected area as needed for Skin Irritation. use thin layer    Provider, Historical   aspirin delayed-release 81 mg tablet Take 1 Tablet by mouth in the morning. Indications: coronary artery disease 8/1/22   Shakira Perez MD   simvastatin (ZOCOR) 20 mg tablet Take 20 mg by mouth nightly. Provider, Historical   mirabegron ER (MYRBETRIQ) 25 mg ER tablet Take 25 mg by mouth in the morning. Provider, Historical   multivitamin (ONE A DAY) tablet Take 1 Tablet by mouth in the morning. Provider, Historical   ubidecarenone/vitamin E mixed (COQ10  PO) Take 1 Tablet by mouth daily. Provider, Historical   vit A/vit C/vit E/zinc/copper (OCUVITE PRESERVISION PO) Take  by mouth daily. Provider, Historical       No Known Allergies    Current Medications:   Current Outpatient Medications   Medication Sig Dispense Refill    acetaminophen (TYLENOL) 325 mg tablet Take 2 Tablets by mouth every four (4) hours as needed for Pain.      triamcinolone acetonide (KENALOG) 0.1 % topical cream Apply  to affected area as needed for Skin Irritation. use thin layer      aspirin delayed-release 81 mg tablet Take 1 Tablet by mouth in the morning. Indications: coronary artery disease 90 Tablet 3    simvastatin (ZOCOR) 20 mg tablet Take 20 mg by mouth nightly. mirabegron ER (MYRBETRIQ) 25 mg ER tablet Take 25 mg by mouth in the morning.      multivitamin (ONE A DAY) tablet Take 1 Tablet by mouth in the morning. ubidecarenone/vitamin E mixed (COQ10  PO) Take 1 Tablet by mouth daily.       vit A/vit C/vit E/zinc/copper (OCUVITE PRESERVISION PO) Take  by mouth daily. Vitals: There were no vitals taken for this visit. Allergies: has No Known Allergies.     Review of Systems: Pertinent Positives per HPI   [x]Total of 13 systems reviewed as follows:  Constitutional: Negative fever, negative chills  Eyes:   Negative for amauroses fugax  ENT:   Negative sore throat,oral abscess   Endocrine Negative for thyroid replacement Rx; goiter; DM  Respiratory:  Negative chronic cough,sputum production  Cards:   Negative for palpitations, varicosities, claudication, lower extremity edema  GI:   Negative for dysphagia, bleeding, nausea, vomiting, diarrhea, and abdominal pain  Genitourinary: Negative for frequency, dysuria  Integument:  Negative for rash and pruritus  Hematologic:  Negative for easy bruising; bleeding dyscrasia   Musculoskel: Negative for muscle weakness inhibiting ambulation  Neurological:  Negative for TIA, syncope, dizziness, +stroke, +right leg weakness  Behavl/Psych: Negative for feelings of anxiety, depression     Cardiovascular Testing:     EKG:   EKG RESULTS       Procedure Component Value Units Date/Time    EKG, 12 LEAD, INITIAL [[id number]] Collected: 02/09/23 2038    Order Status: Completed Updated: 02/10/23 1656     Ventricular Rate 83 BPM      Atrial Rate 83 BPM      P-R Interval 134 ms      QRS Duration 148 ms      Q-T Interval 412 ms      QTC Calculation (Bezet) 484 ms      Calculated P Axis 64 degrees      Calculated R Axis -42 degrees      Calculated T Axis 110 degrees      Diagnosis --     Normal sinus rhythm  Left axis deviation  Left bundle branch block  When compared with ECG of 09-FEB-2023 20:37,  MANUAL COMPARISON REQUIRED, DATA IS UNCONFIRMED  Confirmed by Jaimie Chapman MD ([id number]) on 2/10/2023 4:56:08 PM      EKG, 12 LEAD, INITIAL [[id number]] Collected: 02/09/23 2037    Order Status: Completed Updated: 02/10/23 1656     Ventricular Rate 82 BPM      Atrial Rate 82 BPM      P-R Interval 138 ms      QRS Duration 150 ms Q-T Interval 412 ms      QTC Calculation (Bezet) 481 ms      Calculated P Axis 62 degrees      Calculated R Axis -41 degrees      Calculated T Axis 111 degrees      Diagnosis --     Normal sinus rhythm  Possible Left atrial enlargement  Left axis deviation  Left bundle branch block  When compared with ECG of 09-FEB-2023 12:23,  MANUAL COMPARISON REQUIRED, DATA IS UNCONFIRMED  Confirmed by Zenaida Moraes MD ([id number]) on 2/10/2023 4:55:46 PM      EKG, 12 LEAD, INITIAL [[id number]] Collected: 02/09/23 1223    Order Status: Completed Updated: 02/10/23 1520     Ventricular Rate 79 BPM      Atrial Rate 79 BPM      P-R Interval 136 ms      QRS Duration 150 ms      Q-T Interval 412 ms      QTC Calculation (Bezet) 472 ms      Calculated P Axis 69 degrees      Calculated R Axis -16 degrees      Calculated T Axis 114 degrees      Diagnosis --     Normal sinus rhythm  Possible Left atrial enlargement  Left bundle branch block  When compared with ECG of 08-FEB-2023 10:38,  premature atrial complexes are no longer present  Left bundle branch block is now present  Confirmed by Zenaida Moraes MD ([id number]) on 2/10/2023 3:20:01 PM      EKG, 12 LEAD, INITIAL [[id number]] Collected: 02/08/23 1038    Order Status: Completed Updated: 02/08/23 1607     Ventricular Rate 73 BPM      Atrial Rate 73 BPM      P-R Interval 152 ms      QRS Duration 88 ms      Q-T Interval 410 ms      QTC Calculation (Bezet) 451 ms      Calculated P Axis 65 degrees      Calculated R Axis 58 degrees      Calculated T Axis -43 degrees      Diagnosis --     Sinus rhythm with premature atrial complexes  Left ventricular hypertrophy with repolarization abnormality ( Sokolow-Terrell )  Abnormal ECG  When compared with ECG of 01-FEB-2023 08:37,  premature atrial complexes are now present  ST elevation has replaced ST depression in Anterior leads  Confirmed by Zenaida Moraes MD ([id number]) on 2/8/2023 4:07:00 PM      EKG, 12 LEAD, INITIAL [[id number]] Collected: 02/01/23 9395 Order Status: Completed Updated: 02/02/23 1145     Ventricular Rate 97 BPM      Atrial Rate 97 BPM      P-R Interval 140 ms      QRS Duration 88 ms      Q-T Interval 350 ms      QTC Calculation (Bezet) 444 ms      Calculated P Axis 70 degrees      Calculated R Axis 71 degrees      Calculated T Axis 75 degrees      Diagnosis --     Normal sinus rhythm  Voltage criteria for left ventricular hypertrophy  Nonspecific ST abnormality    No previous ECGs available  Confirmed by Kimberley Fothergill, M.D., [place] ([id number]) on 2/2/2023 11:44:58 AM                TTE:  02/08/23    ECHO ADULT COMPLETE 02/09/2023 2/9/2023    Interpretation Summary    Left Ventricle: Normal left ventricular systolic function with a visually estimated EF of 60 - 65%. Left ventricle size is normal. Mildly increased wall thickness. Normal wall motion. Grade I diastolic dysfunction with normal LAP. Aortic Valve: 26 mm Watson Live 3 Ultra THV + 1 mL extra volume appropriately positioned transcatheter bioprosthetic valve that is well-seated. Trace to mild paravalvular regurgitation. No stenosis. AV mean gradient is 7.69 mmHg. AV peak velocity is 1.7 m/s. LVOT:AV VTI Index is 0.71. LVOT diameter is 2.2 cm. AV area by continuity VTI is 2.69 cm2. Mitral Valve: Mildly calcified leaflet. Mild annular calcification of the mitral valve. Mild regurgitation. Trace stenosis noted. MV mean gradient is 3.5 mmHg at HR 75 bpm.    Tricuspid Valve: Mildly elevated RVSP. The estimated RVSP is 35 mmHg. Left Atrium: Left atrium is mildly dilated. Signed by: Abiel Neil MD on 2/9/2023 10:15 AM        Physical Exam:  No physical exam performed due to telephone encounter. Assessment/Plan: Aortic Stenosis-s/p RTF 26 S3 + 1 ml  TAVR on 2/8/23. ASA following TAVR. Echo, EKG as above  New LBBB following TAVR. Has event monitor through S offices  Acute CVA following TAVR: Several small areas of acute infarct in the left frontal, parietal, and occipital lobes. Continue PT/OT/ST at SNF. Continue ASA, Statin. Chronic HFpEF:  BP management as needed. HTN: Not currently on antihypertensives  HLD: Statin  BPH: Flomax  Severe malnutrition: (severe body fat loss and muscle mass loss). Has been seen by a dietician and added Ensure and Magic Cup. Hx of Esophageal CA s/p Esophagectomy 2004: No current treatment   Thrombocytopenia: platelets 828P on DC. Ok to continue ASA and continue to monitor    SBE prophylax reviewed. May begin Cardiac Rehab     F/U with primary cardiologist      Ke Bre was evaluated through a patient-initiated, synchronous (real-time) audio only encounter. He (or guardian if applicable) is aware that it is a billable service, which includes applicable co-pays, with coverage as determined by his insurance carrier. This visit was conducted with the patient's (and/or Ana Wiggins guardian's) verbal consent. He has not had a related appointment within my department in the past 7 days or scheduled within the next 24 hours. The patient was located in a state where the provider was licensed to provide care. The patient was located at: Home: [address] [place] [address]  The provider was located at:  Facility (Appt Department): [address] [place] [address], [address]  [place] [address] [address] [facility] [id number]    Total Time: minutes: 512 Crow Webb NP 69 yr old female c/o abdominal pain x a few days but worsening today. A+O x 4.  at the bedside. PMH of SBO. Hypoactive BS in RLQ and LLQ. Pt denies sob, CP, back pain, BLE edema. Lungs clear jeremy. S1/S2. Urine yellow and clear. Abdomen tender to palpation. Bladder soft, non distended, and non tender. skin warm dry and intact. Reported pain scale at rest 8/10 and 10/10 with movement or when pain intensifies. Gait steady and patient is independently ambulatory. will continue to monitor.

## 2024-01-10 ENCOUNTER — OFFICE VISIT (OUTPATIENT)
Age: 84
End: 2024-01-10

## 2024-01-10 DIAGNOSIS — I35.0 NONRHEUMATIC AORTIC VALVE STENOSIS: Primary | ICD-10-CM

## 2024-01-10 DIAGNOSIS — Z95.2 S/P TAVR (TRANSCATHETER AORTIC VALVE REPLACEMENT): ICD-10-CM

## 2024-01-10 PROCEDURE — 99442 PR PHYS/QHP TELEPHONE EVALUATION 11-20 MIN: CPT | Performed by: NURSE PRACTITIONER

## 2024-01-10 NOTE — PROGRESS NOTES
Patient: Nestor Wen   Age: 83 y.o.     Patient Care Team:  Ankur Zamorano MD as PCP - General    PCP: Ankur Zamorano MD    Cardiologist: Dr. Sanabria    Diagnosis/Reason for Consultation: The primary encounter diagnosis was Nonrheumatic aortic valve stenosis. A diagnosis of S/P TAVR (transcatheter aortic valve replacement) was also pertinent to this visit.    Problem List:   Patient Active Problem List   Diagnosis    Nonrheumatic aortic valve stenosis    Primary hypertension    Mixed hyperlipidemia    BPH (benign prostatic hyperplasia)    Esophageal cancer (HCC)    Aortic stenosis, severe    Severe protein-calorie malnutrition (HCC)    S/P TAVR (transcatheter aortic valve replacement)         HPI: 83 y.o.  male  S/P  transcatheter aortic valve replacement with RTF 26 mm + 1 ml extra volume S3 by Kaden Sanabria and Niels on 2/8/23. He was taken from the OR to PACU/CVSU. In CVSU, the patient was noted to have right leg weakness and difficulty sitting up - a code stroke was called and he was found to have an acute CVA following TAVR. He worked with PT/OT/ST  who made recommendations for discharge to an IPR based on his right leg weakness.  However, the patient chose to be discharged to a skilled facility where he lives to remain closer to his wife who has dementia.  He will get PT/OT/ST there to continue his rehab.   He is available today by telephone for his 1 year follow up appt.    Denies fever, chills, worsening shortness of breath, chest pain, orthopnea, PND, dizziness, syncope or recent fall.  He does feel fatigued on a daily basis.  He is living at [facility] and in involved in exercise programs there but feels that his activity level has never returned to his pre-TAVR level.  The decline is based on his deficits from the CVA.  He struggles with back pain and continues to have difficulty walking due to right leg weakness.  He thinks using a cane or walker makes this worse. He recently saw his

## 2024-05-07 ENCOUNTER — HOSPITAL ENCOUNTER (OUTPATIENT)
Facility: HOSPITAL | Age: 84
Discharge: HOME | End: 2024-05-10
Payer: MEDICARE

## 2024-05-07 DIAGNOSIS — M54.6 THORACIC BACK PAIN, UNSPECIFIED BACK PAIN LATERALITY, UNSPECIFIED CHRONICITY: ICD-10-CM

## 2024-05-07 DIAGNOSIS — M54.50 LOW BACK PAIN, UNSPECIFIED BACK PAIN LATERALITY, UNSPECIFIED CHRONICITY, UNSPECIFIED WHETHER SCIATICA PRESENT: ICD-10-CM

## 2024-05-07 PROCEDURE — 72110 X-RAY EXAM L-2 SPINE 4/>VWS: CPT

## 2024-05-07 PROCEDURE — 72072 X-RAY EXAM THORAC SPINE 3VWS: CPT

## 2025-07-16 ENCOUNTER — HOSPITAL ENCOUNTER (OUTPATIENT)
Age: 85
Discharge: HOME | End: 2025-07-19
Payer: MEDICARE

## 2025-07-16 DIAGNOSIS — M53.3 DISORDER OF SACRUM: ICD-10-CM

## 2025-07-16 DIAGNOSIS — L40.9 PSORIASIS: ICD-10-CM

## 2025-07-16 PROCEDURE — 72202 X-RAY EXAM SI JOINTS 3/> VWS: CPT

## 2025-07-16 PROCEDURE — 72170 X-RAY EXAM OF PELVIS: CPT

## (undated) DEVICE — COVER US PRB W15XL120CM W/ GEL RUBBERBAND TAPE STRP FLD GEN

## (undated) DEVICE — KIT MFLD ISOLATN NACL CNTRST PRT TBNG SPIK W/ PRSS TRNSDUC

## (undated) DEVICE — GDWIRE STR SUP STF 0.035INX180 --

## (undated) DEVICE — CATHETER ETER ANGIO L110CM OD5FR ID046IN L75CM 038IN 145DEG CARD

## (undated) DEVICE — 3M™ TEGADERM™ TRANSPARENT FILM DRESSING FRAME STYLE, 1626W, 4 IN X 4-3/4 IN (10 CM X 12 CM), 50/CT 4CT/CASE: Brand: 3M™ TEGADERM™

## (undated) DEVICE — 1000ML,PRESSURE INFUSER W/STOPCOCK: Brand: MEDLINE

## (undated) DEVICE — GOWN,SIRUS,FABRNF,XL,20/CS: Brand: MEDLINE

## (undated) DEVICE — ANGIOGRAPHY KIT

## (undated) DEVICE — SPLINT WR POS F/ARTERIAL ACC -- BX/10

## (undated) DEVICE — PACK PROCEDURE SURG HRT CATH

## (undated) DEVICE — ANGIO-SEAL VIP VASCULAR CLOSURE DEVICE: Brand: ANGIO-SEAL

## (undated) DEVICE — KIT MED IMAG CNTRST AGNT W/ IOPAMIDOL REUSE

## (undated) DEVICE — APPLICATOR MEDICATED 26 CC SOLUTION HI LT ORNG CHLORAPREP

## (undated) DEVICE — KIT AT-X65 ANGIOTOUCH HAND CONTROLLER

## (undated) DEVICE — GLOVE SURG SZ 8 CRM LTX FREE POLYISOPRENE POLYMER BEAD ANTI

## (undated) DEVICE — SYRINGE MED 30ML STD CLR PLAS LUERLOCK TIP N CTRL DISP

## (undated) DEVICE — GDWIRE COR 0.035INX260CM -- CONFIDA

## (undated) DEVICE — KIT HND CTRL 3 W STPCOCK ROT END 54IN PREM HI PRSS TBNG AT

## (undated) DEVICE — INTENDED TO STANDARDIZE OR CAMERAS TO ALLOW FOR THE USE OF THE OR CAMERA COVER: Brand: ASPEN® O.R. CAMERA COVER

## (undated) DEVICE — PROVE COVER: Brand: UNBRANDED

## (undated) DEVICE — KIT COR DIAG CATH ANGIO 5FR CURVES JL 4.0 100CM JR 4.0

## (undated) DEVICE — STIFFEN MICRO-INTRODUCER KIT: Brand: STIFFEN MICRO-INTRODUCER KIT

## (undated) DEVICE — GUIDEWIRE VASC L260CM DIA0.035IN COIL L15CM FLX TIP L4CM

## (undated) DEVICE — TRUE™ DILATATION BALLOON VALVULOPLASTY CATHETER, 20 MM X 4.5 CM, 110 CM CATHETER: Brand: TRUE DILATATION

## (undated) DEVICE — GLIDESHEATH SLENDER STAINLESS STEEL KIT: Brand: GLIDESHEATH SLENDER

## (undated) DEVICE — GUIDEWIRE VASC L260CM DIA0.035IN RAD 3MM J TIP L7CM PTFE

## (undated) DEVICE — PINNACLE INTRODUCER SHEATH: Brand: PINNACLE

## (undated) DEVICE — COVER,TABLE,60X90,STERILE: Brand: MEDLINE

## (undated) DEVICE — AMPLATZ EXTRA STIFF WIRE GUIDE: Brand: AMPLATZ

## (undated) DEVICE — TR BAND RADIAL ARTERY COMPRESSION DEVICE: Brand: TR BAND

## (undated) DEVICE — WASTE KIT - ST MARY: Brand: MEDLINE INDUSTRIES, INC.

## (undated) DEVICE — GUIDEWIRE VASC L260CM DIA0.035IN TIP L3MM STD EXCHG PTFE J

## (undated) DEVICE — WRAP SURG W1.31XL1.34M CARD FOR PT 165-172CM THERMOWRP

## (undated) DEVICE — CATHETER DIAG 5FR L100CM AL AL 1.0 CRV SZ DBL BRAID WIRE

## (undated) DEVICE — C-ARM: Brand: UNBRANDED

## (undated) DEVICE — SPECIAL PROCEDURE DRAPE 32" X 34": Brand: SPECIAL PROCEDURE DRAPE

## (undated) DEVICE — ANGIOGRAPHIC CATHETER: Brand: IMPULSE™

## (undated) DEVICE — GUIDEWIRE VASC L150CM DIA0.035IN FLX TIP L7CM PTFE STR FIX

## (undated) DEVICE — PERCLOSE PROGLIDE™ SUTURE-MEDIATED CLOSURE SYSTEM: Brand: PERCLOSE PROGLIDE™

## (undated) DEVICE — LUER-LOK 360°: Brand: CONNECTA, LUER-LOK

## (undated) DEVICE — REM POLYHESIVE ADULT PATIENT RETURN ELECTRODE: Brand: VALLEYLAB

## (undated) DEVICE — OPEN HEART A- RICHMOND: Brand: MEDLINE INDUSTRIES, INC.

## (undated) DEVICE — 6 FOOT DISPOSABLE EXTENSION CABLE WITH SAFE CONNECT / SCREW-DOWN

## (undated) DEVICE — GUIDEWIRE VASC L260CM DIA0.035IN TIP L7CM PTFE S STL STR

## (undated) DEVICE — SYRINGE MED 50ML LUERLOCK TIP